# Patient Record
Sex: FEMALE | Race: WHITE | NOT HISPANIC OR LATINO | Employment: PART TIME | URBAN - METROPOLITAN AREA
[De-identification: names, ages, dates, MRNs, and addresses within clinical notes are randomized per-mention and may not be internally consistent; named-entity substitution may affect disease eponyms.]

---

## 2017-02-02 ENCOUNTER — ALLSCRIPTS OFFICE VISIT (OUTPATIENT)
Dept: OTHER | Facility: OTHER | Age: 27
End: 2017-02-02

## 2017-04-18 ENCOUNTER — GENERIC CONVERSION - ENCOUNTER (OUTPATIENT)
Dept: OTHER | Facility: OTHER | Age: 27
End: 2017-04-18

## 2017-06-29 ENCOUNTER — ALLSCRIPTS OFFICE VISIT (OUTPATIENT)
Dept: OTHER | Facility: OTHER | Age: 27
End: 2017-06-29

## 2017-08-12 ENCOUNTER — ALLSCRIPTS OFFICE VISIT (OUTPATIENT)
Dept: OTHER | Facility: OTHER | Age: 27
End: 2017-08-12

## 2017-10-20 ENCOUNTER — ALLSCRIPTS OFFICE VISIT (OUTPATIENT)
Dept: OTHER | Facility: OTHER | Age: 27
End: 2017-10-20

## 2017-10-21 NOTE — PROGRESS NOTES
Assessment  1  Multiple contusions (924 8) (T07  Martina Araujo)    Plan  Need for influenza vaccination    · Stop: Influenza    Discussion/Summary    Advil as needed  No other treatment necessary since both joints are improving  Chief Complaint  1  Knee Injury   2  Knee Pain  Patient is in the office today complaining of having fallen at work last Tuesday she fell on her right knee and right elbow and she is complaining of the pain now she stated that it was sore but now the pain is worse, she also has soreness in her lower back and right hip  History of Present Illness  HPI: Patient comes in after slipping on a wet floor one week ago falling onto her right elbow and right knee  Review of Systems    Constitutional: No fever, no chills, feels well, no tiredness, no recent weight gain or loss  Respiratory: no complaints of shortness of breath, no wheezing, no dyspnea on exertion, no orthopnea or PND  Gastrointestinal: no complaints of abdominal pain, no constipation, no nausea or diarrhea, no vomiting, no bloody stools  Active Problems  1  Acute rhinosinusitis (461 9) (J01 90)   2  Encounter for PPD test (V74 1) (Z11 1)   3  Hypothyroidism (244 9) (E03 9)   4  Need for influenza vaccination (V04 81) (Z23)   5  Need for Tdap vaccination (V06 1) (Z23)   6  Rash (782 1) (R21)   7  Tachycardia (785 0) (R00 0)    Past Medical History  1  History of Abdominal pain, LLQ (789 04) (R10 32)   2  History of Acute bacterial conjunctivitis, unspecified laterality (372 03) (H10 30)   3  History of Acute frontal sinusitis, recurrence not specified (461 1) (J01 10)   4  History of Acute URI (465 9) (J06 9)   5  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)   6  History of Cellulitis of both feet (682 7) (L03 115,L03 116)   7  History of acute sinusitis (V12 69) (Z87 09)   8  History of candidiasis of mouth (V12 09) (Z86 19)   9  History of gastroenteritis (V12 79) (Z87 19)   10   History of nausea and vomiting (V12 79) (Z87 898)   11  History of stress test (V15 89) (Z92 89)   12  History of Otitis media (382 9) (H66 90)   13  History of Palpitations (785 1) (R00 2)   14  History of Sinusitis (473 9) (J32 9)   15  History of Tenosynovitis of hand or wrist (727 05) (M65 88)  Active Problems And Past Medical History Reviewed: The active problems and past medical history were reviewed and updated today  Family History  Mother    1  Denied: Family history of mental disorder   2  Denied: Family history of substance abuse   3  Family history of Hypothyroidism   4  Family history of Psoriasis  Father    5  Family history of Diabetes   6  Family history of Hypertension   7  Family history of Hypothyroidism   8  Family history of Tachycardia    Social History   · Denied: History of Alcohol use   · Always uses seat belt   · Denied: History of Drug use   · Employed   · Never a smoker   · Never smoked   · Occasional caffeine consumption   · Single    Surgical History  1  Denied: History Of Prior Surgery    Current Meds   1  Cefuroxime Axetil 500 MG Oral Tablet; 1 bid with food; Therapy: 06Uwg8025 to (Last Rx:91Nil1982)  Requested for: 86Vhr1967 Ordered   2  Levothyroxine Sodium 175 MCG Oral Tablet; TAKE 1 TABLET DAILY; Therapy: 42KZL8100 to (Rajesh Garzon)  Requested for: 14MUR7109; Last   EX:52PCK5255 Ordered    The medication list was reviewed and updated today  Allergies  1  Penicillins    Vitals   Recorded: 34KNK7658 03:29PM   Temperature 97 4 F   Heart Rate 80   Systolic 245   Diastolic 70   Height 5 ft 2 in   Weight 153 lb    BMI Calculated 27 98   BSA Calculated 1 71   O2 Saturation 96     Physical Exam    Musculoskeletal   Gait and station: Normal     Digits and nails: Normal without clubbing or cyanosis  Inspection/palpation of joints, bones, and muscles: Abnormal  -- Ecchymosis of right elbow and right knee  No joint effusion  Good range of motion in both joints          Signatures   Electronically signed by : STEVIE Méndez ; Oct 20 2017  4:05PM EST                       (Author)

## 2017-12-14 ENCOUNTER — ALLSCRIPTS OFFICE VISIT (OUTPATIENT)
Dept: OTHER | Facility: OTHER | Age: 27
End: 2017-12-14

## 2017-12-15 NOTE — PROGRESS NOTES
Assessment  1  Acute rhinosinusitis (461 9) (J01 90)    Plan  PMH: Acute URI    · Cefuroxime Axetil 500 MG Oral Tablet; 1 bid with food    Chief Complaint  Pt in office today c/o sore throat, cough, ear pain, dizziness, and congestion  Symptoms started 2 days ago  History of Present Illness  HPI: Patient comes in with sinus congestion, sore throat and cough  Review of Systems   Constitutional: fever-- and-- feeling poorly  ENT: nasal discharge  Respiratory: cough  Gastrointestinal: no complaints of abdominal pain, no constipation, no nausea or diarrhea, no vomiting, no bloody stools  Active Problems  1  Acute rhinosinusitis (461 9) (J01 90)   2  Encounter for PPD test (V74 1) (Z11 1)   3  Hypothyroidism (244 9) (E03 9)   4  Multiple contusions (924 8) (T07  XXXA)   5  Need for influenza vaccination (V04 81) (Z23)   6  Need for Tdap vaccination (V06 1) (Z23)   7  Rash (782 1) (R21)   8  Tachycardia (785 0) (R00 0)    Past Medical History  1  History of Abdominal pain, LLQ (789 04) (R10 32)   2  History of Acute bacterial conjunctivitis, unspecified laterality (372 03) (H10 30)   3  History of Acute frontal sinusitis, recurrence not specified (461 1) (J01 10)   4  History of Acute URI (465 9) (J06 9)   5  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)   6  History of Cellulitis of both feet (682 7) (L03 115,L03 116)   7  History of acute sinusitis (V12 69) (Z87 09)   8  History of candidiasis of mouth (V12 09) (Z86 19)   9  History of gastroenteritis (V12 79) (Z87 19)   10  History of nausea and vomiting (V12 79) (Z87 898)   11  History of stress test (V15 89) (Z92 89)   12  History of Otitis media (382 9) (H66 90)   13  History of Palpitations (785 1) (R00 2)   14  History of Sinusitis (473 9) (J32 9)   15  History of Tenosynovitis of hand or wrist (727 05) (M65 88)  Active Problems And Past Medical History Reviewed: The active problems and past medical history were reviewed and updated today        Family History  Mother    1  Denied: Family history of mental disorder   2  Denied: Family history of substance abuse   3  Family history of Hypothyroidism   4  Family history of Psoriasis  Father    5  Family history of Diabetes   6  Family history of Hypertension   7  Family history of Hypothyroidism   8  Family history of Tachycardia    Social History   · Denied: History of Alcohol use   · Always uses seat belt   · Denied: History of Drug use   · Employed   · Never a smoker   · Never smoked   · Occasional caffeine consumption   · Single    Surgical History  1  Denied: History Of Prior Surgery    Current Meds   1  Levothyroxine Sodium 175 MCG Oral Tablet; TAKE 1 TABLET DAILY; Therapy: 43XWV6039 to (Ross Graves)  Requested for: 85DOR9853; Last WS:[id number] Ordered    The medication list was reviewed and updated today  Allergies  1  Penicillins    Vitals   Recorded: 24ISF1839 10:12AM   Temperature 99 8 F   Heart Rate 341   Systolic 400   Diastolic 76   Height 5 ft 2 in   Weight 152 lb    BMI Calculated 27 8   BSA Calculated 1 7   O2 Saturation 97     Physical Exam   Constitutional  General appearance: No acute distress, well appearing and well nourished  Eyes  Conjunctiva and lids: No swelling, erythema or discharge  Pupils and irises: Equal, round and reactive to light  Ears, Nose, Mouth, and Throat  External inspection of ears and nose: Normal    Otoscopic examination: Tympanic membranes translucent with normal light reflex  Canals patent without erythema  Nasal mucosa, septum, and turbinates: Abnormal   The bilateral nasal mucosa was red  Oropharynx: Abnormal   The posterior pharynx was erythematous  Pulmonary  Respiratory effort: No increased work of breathing or signs of respiratory distress  Auscultation of lungs: Clear to auscultation  Cardiovascular  Auscultation of heart: Normal rate and rhythm, normal S1 and S2, without murmurs     Examination of extremities for edema and/or varicosities: Normal    Abdomen  Abdomen: Non-tender, no masses  Lymphatic  Palpation of lymph nodes in neck: No lymphadenopathy  Musculoskeletal  Gait and station: Normal    Digits and nails: Normal without clubbing or cyanosis     Inspection/palpation of joints, bones, and muscles: Normal    Psychiatric  Orientation to person, place, and time: Normal    Mood and affect: Normal          Signatures   Electronically signed by : STEVIE Jaimes ; Dec 14 2017 10:31AM EST                       (Author)

## 2018-01-10 NOTE — PROGRESS NOTES
Assessment    1  Acute frontal sinusitis, recurrence not specified (461 1) (J01 10)    Plan  Acute frontal sinusitis, recurrence not specified    · Ciprofloxacin HCl - 500 MG Oral Tablet; TAKE 1 TABLET EVERY 12 HOURS DAILY  Hypothyroidism    · Levothyroxine Sodium 175 MCG Oral Tablet; TAKE 1 TABLET DAILY    History of Present Illness  Sinusitis (Brief): The patient is being seen for an initial evaluation of sinusitis  The sinusitis involves the frontal sinuses  The sinusitis is classified as acute  The patient is currently experiencing symptoms  facial pain facial pressure   Associated symptoms:   No associated symptoms are reported  Review of Systems    Constitutional: No fever, no chills, feels well, no tiredness, no recent weight gain or loss  ENT: as noted in HPI  Cardiovascular: no complaints of slow or fast heart rate, no chest pain, no palpitations, no leg claudication or lower extremity edema  Respiratory: no complaints of shortness of breath, no wheezing, no dyspnea on exertion, no orthopnea or PND  Gastrointestinal: no complaints of abdominal pain, no constipation, no nausea or diarrhea, no vomiting, no bloody stools  Active Problems    1  Abdominal pain, LLQ (789 04) (R10 32)   2  Acute sinus infection (461 9) (J01 90)   3  Candidiasis of mouth (112 0) (B37 0)   4  Encounter for PPD test (V74 1) (Z11 1)   5  Gastroenteritis (558 9) (K52 9)   6  Hypothyroidism (244 9) (E03 9)   7  Nausea and/or vomiting (787 01) (R11 2)   8  Need for influenza vaccination (V04 81) (Z23)   9  Need for Tdap vaccination (V06 1) (Z23)   10  Rash (782 1) (R21)   11  Tachycardia (785 0) (R00 0)    Past Medical History    1  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)   2  History of stress test (V15 89) (Z92 89)   3  History of Otitis media (382 9) (H66 90)   4  History of Palpitations (785 1) (R00 2)   5  History of Sinusitis (473 9) (J32 9)   6   History of Tenosynovitis of hand or wrist (727 05) (M65 88)    Family History    1  Family history of Hypothyroidism   2  Family history of Psoriasis    3  Family history of Diabetes   4  Family history of Hypertension   5  Family history of Hypothyroidism   6  Family history of Tachycardia    Social History    · Denied: History of Alcohol use   · Denied: History of Drug use   · Never a smoker   · Never smoked    Current Meds   1  Levothyroxine Sodium 175 MCG Oral Tablet; TAKE 1 TABLET DAILY; Therapy: 55WFE6201 to (Evaluate:03Sns3591)  Requested for: 04NPA8976; Last   Rx:13Jun2015 Ordered   2  Metoprolol Tartrate 25 MG Oral Tablet; Therapy: (Recorded:24Nov2014) to Recorded   3  Omeprazole 20 MG Oral Capsule Delayed Release; Therapy: 97ZXF5622 to (Evaluate:19Mar2015) Recorded    Allergies    1  Penicillins    Vitals   Recorded: 80GFP1142 10:23AM   Height 5 ft 2 in   Weight 166 lb    BMI Calculated 30 36   BSA Calculated 1 77     Physical Exam    Constitutional   General appearance: No acute distress, well appearing and well nourished  Ears, Nose, Mouth, and Throat   Nasal mucosa, septum, and turbinates: Abnormal   boggy erythematous turbinates  Pulmonary   Auscultation of lungs: Clear to auscultation  Cardiovascular   Auscultation of heart: Normal rate and rhythm, normal S1 and S2, without murmurs  Examination of extremities for edema and/or varicosities: Normal     Abdomen   Abdomen: Non-tender, no masses  Musculoskeletal   Gait and station: Normal     Neurologic   Cranial nerves: Cranial nerves 2-12 intact      Psychiatric   Orientation to person, place, and time: Normal          Signatures   Electronically signed by : Bart Brennan DO; Feb 1 2016 10:30AM EST                       (Author)

## 2018-01-12 VITALS
TEMPERATURE: 97.4 F | DIASTOLIC BLOOD PRESSURE: 70 MMHG | BODY MASS INDEX: 28.16 KG/M2 | OXYGEN SATURATION: 96 % | WEIGHT: 153 LBS | HEART RATE: 80 BPM | HEIGHT: 62 IN | SYSTOLIC BLOOD PRESSURE: 124 MMHG

## 2018-01-13 VITALS
HEART RATE: 72 BPM | TEMPERATURE: 99.2 F | SYSTOLIC BLOOD PRESSURE: 112 MMHG | OXYGEN SATURATION: 99 % | HEIGHT: 62 IN | WEIGHT: 145 LBS | BODY MASS INDEX: 26.68 KG/M2 | DIASTOLIC BLOOD PRESSURE: 76 MMHG

## 2018-01-13 VITALS
OXYGEN SATURATION: 97 % | TEMPERATURE: 99 F | HEART RATE: 81 BPM | SYSTOLIC BLOOD PRESSURE: 114 MMHG | DIASTOLIC BLOOD PRESSURE: 80 MMHG | HEIGHT: 62 IN | WEIGHT: 142 LBS | BODY MASS INDEX: 26.13 KG/M2

## 2018-01-14 VITALS
TEMPERATURE: 98.3 F | HEART RATE: 83 BPM | WEIGHT: 171 LBS | HEIGHT: 62 IN | SYSTOLIC BLOOD PRESSURE: 122 MMHG | OXYGEN SATURATION: 98 % | DIASTOLIC BLOOD PRESSURE: 80 MMHG | BODY MASS INDEX: 31.47 KG/M2

## 2018-01-23 VITALS
HEART RATE: 100 BPM | BODY MASS INDEX: 27.97 KG/M2 | TEMPERATURE: 99.8 F | DIASTOLIC BLOOD PRESSURE: 76 MMHG | OXYGEN SATURATION: 97 % | HEIGHT: 62 IN | WEIGHT: 152 LBS | SYSTOLIC BLOOD PRESSURE: 122 MMHG

## 2018-03-20 ENCOUNTER — OFFICE VISIT (OUTPATIENT)
Dept: FAMILY MEDICINE CLINIC | Facility: CLINIC | Age: 28
End: 2018-03-20

## 2018-03-20 VITALS
OXYGEN SATURATION: 98 % | HEART RATE: 66 BPM | SYSTOLIC BLOOD PRESSURE: 100 MMHG | WEIGHT: 155 LBS | HEIGHT: 62 IN | TEMPERATURE: 97.8 F | DIASTOLIC BLOOD PRESSURE: 68 MMHG | BODY MASS INDEX: 28.52 KG/M2

## 2018-03-20 DIAGNOSIS — L08.9 SUPERFICIAL BACTERIAL SKIN INFECTION: Primary | ICD-10-CM

## 2018-03-20 DIAGNOSIS — B96.89 SUPERFICIAL BACTERIAL SKIN INFECTION: Primary | ICD-10-CM

## 2018-03-20 PROCEDURE — 99213 OFFICE O/P EST LOW 20 MIN: CPT | Performed by: FAMILY MEDICINE

## 2018-03-20 RX ORDER — LEVOTHYROXINE SODIUM 175 UG/1
1 TABLET ORAL DAILY
COMMUNITY
Start: 2015-02-17 | End: 2018-05-15 | Stop reason: SDUPTHER

## 2018-03-20 RX ORDER — SULFAMETHOXAZOLE AND TRIMETHOPRIM 800; 160 MG/1; MG/1
1 TABLET ORAL EVERY 12 HOURS SCHEDULED
Qty: 20 TABLET | Refills: 0 | Status: SHIPPED | OUTPATIENT
Start: 2018-03-20 | End: 2018-03-30

## 2018-03-20 RX ORDER — MUPIROCIN CALCIUM 20 MG/G
CREAM TOPICAL 3 TIMES DAILY
Qty: 15 G | Refills: 0 | Status: SHIPPED | OUTPATIENT
Start: 2018-03-20 | End: 2020-01-22 | Stop reason: ALTCHOICE

## 2018-03-20 NOTE — PROGRESS NOTES
Assessment/Plan:  Discussed with patient care plan , does not appear infective in nature , that being said will treat , would rec not manipulating area , clean and keep piercing in place , explained healing process, may have to remove piercing       Diagnoses and all orders for this visit:    Superficial bacterial skin infection  -     mupirocin (BACTROBAN) 2 % cream; Apply topically 3 (three) times a day  -     sulfamethoxazole-trimethoprim (BACTRIM DS) 800-160 mg per tablet; Take 1 tablet by mouth every 12 (twelve) hours for 10 days    Other orders  -     levothyroxine 175 mcg tablet; Take 1 tablet by mouth daily              Subjective:      Patient ID: Wilhemina Heimlich is a 28 y o  female  Had a nose piercing about 6 wks ago , and feels it keeps getting infected,   Has been cleaning the piercing as instructed ,   Denies discharge to site, denies swelling or redness to area, neg fever , chills           The following portions of the patient's history were reviewed and updated as appropriate:   She has no past medical history on file  ,   does not have a problem list on file  ,   has no past surgical history on file  ,  family history is not on file  ,   reports that she has never smoked  She has never used smokeless tobacco  Her alcohol and drug histories are not on file  ,  is allergic to penicillins         Review of Systems   Constitutional: Negative for appetite change, chills, fever and unexpected weight change  HENT: Negative for congestion, dental problem, ear pain, hearing loss, postnasal drip, rhinorrhea, sneezing, sore throat and tinnitus  Eyes: Negative  Negative for visual disturbance  Respiratory: Negative  Cardiovascular: Negative  Gastrointestinal: Negative for vomiting  Endocrine: Negative  Negative for cold intolerance, heat intolerance, polydipsia, polyphagia and polyuria  Musculoskeletal: Negative for arthralgias, back pain, gait problem, joint swelling and myalgias     Skin: Positive for wound  Negative for color change and rash  Allergic/Immunologic: Negative for environmental allergies and food allergies  Neurological: Negative  Negative for weakness  Hematological: Negative for adenopathy  Does not bruise/bleed easily  Psychiatric/Behavioral: Negative for sleep disturbance and suicidal ideas  The patient is not nervous/anxious  Objective:  Vitals:    03/20/18 1412   BP: 100/68   Pulse: 66   Temp: 97 8 °F (36 6 °C)   SpO2: 98%      Physical Exam   Constitutional: She is oriented to person, place, and time  She appears well-developed and well-nourished  HENT:   Head: Normocephalic and atraumatic  Eyes: Conjunctivae are normal    Neck: Normal range of motion  Pulmonary/Chest: Effort normal    Musculoskeletal: Normal range of motion  Neurological: She is oriented to person, place, and time     Skin:   Nose  Left side with piercing in place   No apparent erythema , redness or swelling   Neg tender  Neg drainage   Pos scar/ keloid  tissue at entry point    Psychiatric: Her behavior is normal  Judgment and thought content normal

## 2018-04-09 ENCOUNTER — OFFICE VISIT (OUTPATIENT)
Dept: FAMILY MEDICINE CLINIC | Facility: CLINIC | Age: 28
End: 2018-04-09

## 2018-04-09 VITALS
TEMPERATURE: 97.1 F | DIASTOLIC BLOOD PRESSURE: 72 MMHG | SYSTOLIC BLOOD PRESSURE: 108 MMHG | HEART RATE: 95 BPM | HEIGHT: 62 IN | BODY MASS INDEX: 28.39 KG/M2 | WEIGHT: 154.3 LBS | OXYGEN SATURATION: 98 %

## 2018-04-09 DIAGNOSIS — H69.80 EUSTACHIAN TUBE DYSFUNCTION, UNSPECIFIED LATERALITY: ICD-10-CM

## 2018-04-09 DIAGNOSIS — J06.9 VIRAL UPPER RESPIRATORY TRACT INFECTION: Primary | ICD-10-CM

## 2018-04-09 PROBLEM — H69.90 EUSTACHIAN TUBE DYSFUNCTION, UNSPECIFIED LATERALITY: Status: ACTIVE | Noted: 2018-04-09

## 2018-04-09 LAB — S PYO AG THROAT QL: NEGATIVE

## 2018-04-09 PROCEDURE — 99212 OFFICE O/P EST SF 10 MIN: CPT | Performed by: NURSE PRACTITIONER

## 2018-04-09 PROCEDURE — 87880 STREP A ASSAY W/OPTIC: CPT | Performed by: NURSE PRACTITIONER

## 2018-04-09 PROCEDURE — 87070 CULTURE OTHR SPECIMN AEROBIC: CPT | Performed by: NURSE PRACTITIONER

## 2018-04-09 RX ORDER — CEFUROXIME AXETIL 500 MG/1
TABLET ORAL
COMMUNITY
Start: 2017-08-12 | End: 2020-01-22 | Stop reason: ALTCHOICE

## 2018-04-09 RX ORDER — CIPROFLOXACIN 500 MG/1
TABLET, FILM COATED ORAL 2 TIMES DAILY
COMMUNITY
Start: 2018-01-02 | End: 2020-01-22 | Stop reason: ALTCHOICE

## 2018-04-09 RX ORDER — MOMETASONE FUROATE 50 UG/1
2 SPRAY, METERED NASAL DAILY
Qty: 17 G | Refills: 0 | Status: SHIPPED | OUTPATIENT
Start: 2018-04-09 | End: 2021-09-20 | Stop reason: ALTCHOICE

## 2018-04-09 NOTE — PROGRESS NOTES
Assessment/Plan:    Upper respiratory infection  Rapid strep negative, will send out for culture and follow  Counseled on increasing fluid intake, blowing nose frequently, and using a cool mist humidifier nighttime  Follow-up is symptoms do not begin to improve by early next week  Eustachian tube dysfunction, unspecified laterality    To begin Nasonex every 12 hours  Diagnoses and all orders for this visit:    Viral upper respiratory tract infection  -     POCT rapid strepA  -     Throat culture    Eustachian tube dysfunction, unspecified laterality  -     mometasone (NASONEX) 50 mcg/act nasal spray; 2 sprays into each nostril daily            Subjective:      Patient ID: Ervin Hopper is a 28 y o  female  URI    This is a new problem  The current episode started in the past 7 days  The problem has been unchanged  There has been no fever  Associated symptoms include congestion, coughing, a plugged ear sensation, rhinorrhea and a sore throat  She has tried acetaminophen (OTC Cold medications ) for the symptoms  The treatment provided mild relief  The following portions of the patient's history were reviewed and updated as appropriate:   She  has no past medical history on file  She   Patient Active Problem List    Diagnosis Date Noted    Upper respiratory infection 04/09/2018    Eustachian tube dysfunction, unspecified laterality 04/09/2018    Hypothyroidism 11/24/2014     She  has no past surgical history on file  Her family history is not on file  She  reports that she has never smoked  She has never used smokeless tobacco  Her alcohol and drug histories are not on file    Current Outpatient Prescriptions   Medication Sig Dispense Refill    levothyroxine 175 mcg tablet Take 1 tablet by mouth daily      cefuroxime (CEFTIN) 500 mg tablet Take by mouth      ciprofloxacin (CIPRO) 500 mg tablet Take by mouth 2 (two) times a day      mometasone (NASONEX) 50 mcg/act nasal spray 2 sprays into each nostril daily 17 g 0    mupirocin (BACTROBAN) 2 % cream Apply topically 3 (three) times a day 15 g 0     No current facility-administered medications for this visit  Current Outpatient Prescriptions on File Prior to Visit   Medication Sig    levothyroxine 175 mcg tablet Take 1 tablet by mouth daily    mupirocin (BACTROBAN) 2 % cream Apply topically 3 (three) times a day     No current facility-administered medications on file prior to visit  She is allergic to penicillins       Review of Systems   Constitutional: Positive for fatigue  Negative for fever  HENT: Positive for congestion, postnasal drip, rhinorrhea, sinus pressure and sore throat  Eyes: Negative  Respiratory: Positive for cough and chest tightness  Cardiovascular: Negative  Genitourinary: Negative  Musculoskeletal: Negative  Neurological: Positive for dizziness  Psychiatric/Behavioral: Negative  /72   Pulse 95   Temp (!) 97 1 °F (36 2 °C)   Ht 5' 2" (1 575 m)   Wt 70 kg (154 lb 4 8 oz)   SpO2 98%   BMI 28 22 kg/m²     Objective:     Physical Exam   Constitutional: She is oriented to person, place, and time  She appears well-developed and well-nourished  HENT:   Head: Normocephalic and atraumatic  Right Ear: Hearing normal  Tympanic membrane is not erythematous  Left Ear: Hearing normal  Tympanic membrane is not erythematous  A middle ear effusion is present  Nose: Mucosal edema and rhinorrhea present  Right sinus exhibits no maxillary sinus tenderness and no frontal sinus tenderness  Left sinus exhibits no maxillary sinus tenderness and no frontal sinus tenderness  Mouth/Throat: Posterior oropharyngeal erythema present  No oropharyngeal exudate  Neck: Neck supple  Cardiovascular: Normal rate, regular rhythm and normal heart sounds  No murmur heard  Pulmonary/Chest: Effort normal and breath sounds normal  No respiratory distress  She has no wheezes  She has no rales   She exhibits no tenderness  Lymphadenopathy:     She has no cervical adenopathy  Neurological: She is alert and oriented to person, place, and time  Psychiatric: She has a normal mood and affect   Her behavior is normal  Judgment and thought content normal        Results for orders placed or performed in visit on 04/09/18   POCT rapid strepA   Result Value Ref Range     RAPID STREP A Negative Negative

## 2018-04-09 NOTE — ASSESSMENT & PLAN NOTE
Rapid strep negative, will send out for culture and follow  Counseled on increasing fluid intake, blowing nose frequently, and using a cool mist humidifier nighttime  Follow-up is symptoms do not begin to improve by early next week

## 2018-04-11 LAB — BACTERIA THROAT CULT: NORMAL

## 2018-05-15 DIAGNOSIS — E03.9 HYPOTHYROIDISM IN ADULT: Primary | ICD-10-CM

## 2018-05-15 RX ORDER — LEVOTHYROXINE SODIUM 175 UG/1
175 TABLET ORAL DAILY
Qty: 90 TABLET | Refills: 0 | Status: SHIPPED | OUTPATIENT
Start: 2018-05-15 | End: 2021-05-26 | Stop reason: ALTCHOICE

## 2020-01-22 ENCOUNTER — OFFICE VISIT (OUTPATIENT)
Dept: FAMILY MEDICINE CLINIC | Facility: CLINIC | Age: 30
End: 2020-01-22

## 2020-01-22 VITALS
DIASTOLIC BLOOD PRESSURE: 80 MMHG | WEIGHT: 176 LBS | TEMPERATURE: 98.7 F | HEIGHT: 62 IN | SYSTOLIC BLOOD PRESSURE: 122 MMHG | HEART RATE: 64 BPM | OXYGEN SATURATION: 94 % | BODY MASS INDEX: 32.39 KG/M2

## 2020-01-22 DIAGNOSIS — B02.9 HERPES ZOSTER WITHOUT COMPLICATION: Primary | ICD-10-CM

## 2020-01-22 PROCEDURE — 99214 OFFICE O/P EST MOD 30 MIN: CPT | Performed by: FAMILY MEDICINE

## 2020-01-22 RX ORDER — VALACYCLOVIR HYDROCHLORIDE 1 G/1
TABLET, FILM COATED ORAL
Qty: 21 TABLET | Refills: 0 | Status: SHIPPED | OUTPATIENT
Start: 2020-01-22 | End: 2021-05-06 | Stop reason: ALTCHOICE

## 2020-01-22 RX ORDER — AMITRIPTYLINE HYDROCHLORIDE 10 MG/1
TABLET, FILM COATED ORAL
Qty: 60 TABLET | Refills: 1 | Status: SHIPPED | OUTPATIENT
Start: 2020-01-22 | End: 2021-05-06 | Stop reason: ALTCHOICE

## 2020-01-22 NOTE — PROGRESS NOTES
Standard Visit   Assessment/Plan:     Visit Diagnosis:  Diagnoses and all orders for this visit:    Herpes zoster without complication  -     valACYclovir (VALTREX) 1,000 mg tablet; Take 1 pill 3 times a day x7 days  -     amitriptyline (ELAVIL) 10 mg tablet; Take 1 pill every evening x2 months       write a note   She can return to work on Monday  Keep the area dry  Complete the entire course of Valtrex   take the amitriptyline 10 mg every night at bedtime x2 months   Call for failure to him    Subjective:    Patient ID: Tony Vasquez is a 26 y o  female  Patient is here with the following concerns:     Complains of a very irritated rash underneath her left breast  Skin was very hypersensitive  Then she felt this blistering rash  No fever   No streaking  She has had chickenpox in the past         The following portions of the patient's history were reviewed and updated as appropriate: allergies, current medications, past family history, past medical history, past social history, past surgical history and problem list     Review of Systems   Constitutional: Positive for activity change and fatigue  Negative for fever  Skin: Positive for rash           /80   Pulse 64   Temp 98 7 °F (37 1 °C)   Ht 5' 2" (1 575 m)   Wt 79 8 kg (176 lb)   SpO2 94%   BMI 32 19 kg/m²   Social History     Socioeconomic History    Marital status: Single     Spouse name: Not on file    Number of children: Not on file    Years of education: Not on file    Highest education level: Not on file   Occupational History    Not on file   Social Needs    Financial resource strain: Not on file    Food insecurity:     Worry: Not on file     Inability: Not on file    Transportation needs:     Medical: Not on file     Non-medical: Not on file   Tobacco Use    Smoking status: Never Smoker    Smokeless tobacco: Never Used   Substance and Sexual Activity    Alcohol use: Not on file    Drug use: Not on file    Sexual activity: Not on file   Lifestyle    Physical activity:     Days per week: Not on file     Minutes per session: Not on file    Stress: Not on file   Relationships    Social connections:     Talks on phone: Not on file     Gets together: Not on file     Attends Alevism service: Not on file     Active member of club or organization: Not on file     Attends meetings of clubs or organizations: Not on file     Relationship status: Not on file    Intimate partner violence:     Fear of current or ex partner: Not on file     Emotionally abused: Not on file     Physically abused: Not on file     Forced sexual activity: Not on file   Other Topics Concern    Not on file   Social History Narrative    Not on file     History reviewed  No pertinent past medical history  History reviewed  No pertinent family history  History reviewed  No pertinent surgical history      Current Outpatient Medications:     amitriptyline (ELAVIL) 10 mg tablet, Take 1 pill every evening x2 months, Disp: 60 tablet, Rfl: 1    levothyroxine 175 mcg tablet, Take 1 tablet (175 mcg total) by mouth daily, Disp: 90 tablet, Rfl: 0    mometasone (NASONEX) 50 mcg/act nasal spray, 2 sprays into each nostril daily, Disp: 17 g, Rfl: 0    valACYclovir (VALTREX) 1,000 mg tablet, Take 1 pill 3 times a day x7 days, Disp: 21 tablet, Rfl: 0  Social History     Socioeconomic History    Marital status: Single     Spouse name: Not on file    Number of children: Not on file    Years of education: Not on file    Highest education level: Not on file   Occupational History    Not on file   Social Needs    Financial resource strain: Not on file    Food insecurity:     Worry: Not on file     Inability: Not on file    Transportation needs:     Medical: Not on file     Non-medical: Not on file   Tobacco Use    Smoking status: Never Smoker    Smokeless tobacco: Never Used   Substance and Sexual Activity    Alcohol use: Not on file    Drug use: Not on file  Sexual activity: Not on file   Lifestyle    Physical activity:     Days per week: Not on file     Minutes per session: Not on file    Stress: Not on file   Relationships    Social connections:     Talks on phone: Not on file     Gets together: Not on file     Attends Taoist service: Not on file     Active member of club or organization: Not on file     Attends meetings of clubs or organizations: Not on file     Relationship status: Not on file    Intimate partner violence:     Fear of current or ex partner: Not on file     Emotionally abused: Not on file     Physically abused: Not on file     Forced sexual activity: Not on file   Other Topics Concern    Not on file   Social History Narrative    Not on file     History reviewed  No pertinent past medical history  Current Outpatient Medications:     amitriptyline (ELAVIL) 10 mg tablet, Take 1 pill every evening x2 months, Disp: 60 tablet, Rfl: 1    levothyroxine 175 mcg tablet, Take 1 tablet (175 mcg total) by mouth daily, Disp: 90 tablet, Rfl: 0    mometasone (NASONEX) 50 mcg/act nasal spray, 2 sprays into each nostril daily, Disp: 17 g, Rfl: 0    valACYclovir (VALTREX) 1,000 mg tablet, Take 1 pill 3 times a day x7 days, Disp: 21 tablet, Rfl: 0  History reviewed  No pertinent family history  Lab Review   No visits with results within 6 Month(s) from this visit  Latest known visit with results is:   Office Visit on 04/09/2018   Component Date Value     RAPID STREP A 04/09/2018 Negative     Throat Culture 04/09/2018 Negative for beta-hemolytic Streptococcus         Objective:     Physical Exam   Constitutional: She is oriented to person, place, and time  She appears well-developed and well-nourished  Neck: Normal range of motion  Neck supple  Cardiovascular: Normal rate  Pulmonary/Chest: Effort normal and breath sounds normal    Neurological: She is alert and oriented to person, place, and time     Skin:   Blistering vesicular rash underneath the breast  Does not cross the midline  No streaking   No signs of secondary infection      Psychiatric: She has a normal mood and affect  Nursing note and vitals reviewed  There are no Patient Instructions on file for this visit          Zeb Klinefelter, CRNP

## 2020-01-29 ENCOUNTER — TELEPHONE (OUTPATIENT)
Dept: FAMILY MEDICINE CLINIC | Facility: CLINIC | Age: 30
End: 2020-01-29

## 2020-01-29 NOTE — TELEPHONE ENCOUNTER
She said she finished her antiviral medication for her shingles  She wanted to know how long will she still have the pain and the sores?

## 2020-02-07 ENCOUNTER — TELEPHONE (OUTPATIENT)
Dept: FAMILY MEDICINE CLINIC | Facility: CLINIC | Age: 30
End: 2020-02-07

## 2021-05-06 ENCOUNTER — TELEPHONE (OUTPATIENT)
Dept: FAMILY MEDICINE CLINIC | Facility: CLINIC | Age: 31
End: 2021-05-06

## 2021-05-06 ENCOUNTER — APPOINTMENT (OUTPATIENT)
Dept: LAB | Facility: HOSPITAL | Age: 31
End: 2021-05-06
Payer: COMMERCIAL

## 2021-05-06 ENCOUNTER — OFFICE VISIT (OUTPATIENT)
Dept: FAMILY MEDICINE CLINIC | Facility: CLINIC | Age: 31
End: 2021-05-06
Payer: COMMERCIAL

## 2021-05-06 VITALS
HEART RATE: 75 BPM | OXYGEN SATURATION: 98 % | WEIGHT: 180 LBS | HEIGHT: 63 IN | TEMPERATURE: 96.7 F | SYSTOLIC BLOOD PRESSURE: 122 MMHG | DIASTOLIC BLOOD PRESSURE: 74 MMHG | BODY MASS INDEX: 31.89 KG/M2

## 2021-05-06 DIAGNOSIS — Z13.6 SCREENING FOR CARDIOVASCULAR CONDITION: ICD-10-CM

## 2021-05-06 DIAGNOSIS — N92.6 ABNORMAL MENSES: ICD-10-CM

## 2021-05-06 DIAGNOSIS — Z13.1 SCREENING FOR DIABETES MELLITUS: ICD-10-CM

## 2021-05-06 DIAGNOSIS — E03.9 ACQUIRED HYPOTHYROIDISM: ICD-10-CM

## 2021-05-06 DIAGNOSIS — N64.3 GALACTORRHEA OF BOTH BREASTS: ICD-10-CM

## 2021-05-06 DIAGNOSIS — R11.2 NON-INTRACTABLE VOMITING WITH NAUSEA, UNSPECIFIED VOMITING TYPE: ICD-10-CM

## 2021-05-06 DIAGNOSIS — R42 VERTIGO: ICD-10-CM

## 2021-05-06 DIAGNOSIS — Z00.00 ANNUAL PHYSICAL EXAM: Primary | ICD-10-CM

## 2021-05-06 LAB
ALBUMIN SERPL BCP-MCNC: 4.4 G/DL (ref 3.5–5)
ALP SERPL-CCNC: 41 U/L (ref 46–116)
ALT SERPL W P-5'-P-CCNC: 25 U/L (ref 12–78)
ANION GAP SERPL CALCULATED.3IONS-SCNC: 10 MMOL/L (ref 4–13)
AST SERPL W P-5'-P-CCNC: 52 U/L (ref 5–45)
BILIRUB SERPL-MCNC: 0.93 MG/DL (ref 0.2–1)
BUN SERPL-MCNC: 14 MG/DL (ref 5–25)
CALCIUM SERPL-MCNC: 9.3 MG/DL (ref 8.3–10.1)
CHLORIDE SERPL-SCNC: 100 MMOL/L (ref 100–108)
CHOLEST SERPL-MCNC: 362 MG/DL (ref 50–200)
CO2 SERPL-SCNC: 27 MMOL/L (ref 21–32)
CREAT SERPL-MCNC: 1.34 MG/DL (ref 0.6–1.3)
ERYTHROCYTE [DISTWIDTH] IN BLOOD BY AUTOMATED COUNT: 12.9 % (ref 11.6–15.1)
GFR SERPL CREATININE-BSD FRML MDRD: 53 ML/MIN/1.73SQ M
GLUCOSE P FAST SERPL-MCNC: 83 MG/DL (ref 65–99)
HCT VFR BLD AUTO: 32.7 % (ref 34.8–46.1)
HDLC SERPL-MCNC: 79 MG/DL
HGB BLD-MCNC: 10.9 G/DL (ref 11.5–15.4)
LDLC SERPL CALC-MCNC: 255 MG/DL (ref 0–100)
MCH RBC QN AUTO: 33.6 PG (ref 26.8–34.3)
MCHC RBC AUTO-ENTMCNC: 33.3 G/DL (ref 31.4–37.4)
MCV RBC AUTO: 101 FL (ref 82–98)
NONHDLC SERPL-MCNC: 283 MG/DL
PLATELET # BLD AUTO: 323 THOUSANDS/UL (ref 149–390)
PMV BLD AUTO: 9.6 FL (ref 8.9–12.7)
POTASSIUM SERPL-SCNC: 4.4 MMOL/L (ref 3.5–5.3)
PROLACTIN SERPL-MCNC: 21.3 NG/ML
PROT SERPL-MCNC: 8.3 G/DL (ref 6.4–8.2)
RBC # BLD AUTO: 3.24 MILLION/UL (ref 3.81–5.12)
SL AMB POCT URINE HCG: NEGATIVE
SODIUM SERPL-SCNC: 137 MMOL/L (ref 136–145)
T4 FREE SERPL-MCNC: 0.16 NG/DL (ref 0.76–1.46)
TRIGL SERPL-MCNC: 139 MG/DL
TSH SERPL DL<=0.05 MIU/L-ACNC: >500 UIU/ML (ref 0.36–3.74)
WBC # BLD AUTO: 7.66 THOUSAND/UL (ref 4.31–10.16)

## 2021-05-06 PROCEDURE — 84443 ASSAY THYROID STIM HORMONE: CPT

## 2021-05-06 PROCEDURE — 85027 COMPLETE CBC AUTOMATED: CPT

## 2021-05-06 PROCEDURE — 80061 LIPID PANEL: CPT

## 2021-05-06 PROCEDURE — 84439 ASSAY OF FREE THYROXINE: CPT

## 2021-05-06 PROCEDURE — 84146 ASSAY OF PROLACTIN: CPT

## 2021-05-06 PROCEDURE — 3725F SCREEN DEPRESSION PERFORMED: CPT | Performed by: PHYSICIAN ASSISTANT

## 2021-05-06 PROCEDURE — 36415 COLL VENOUS BLD VENIPUNCTURE: CPT

## 2021-05-06 PROCEDURE — 80053 COMPREHEN METABOLIC PANEL: CPT

## 2021-05-06 PROCEDURE — 99395 PREV VISIT EST AGE 18-39: CPT | Performed by: PHYSICIAN ASSISTANT

## 2021-05-06 PROCEDURE — 1036F TOBACCO NON-USER: CPT | Performed by: PHYSICIAN ASSISTANT

## 2021-05-06 PROCEDURE — 3008F BODY MASS INDEX DOCD: CPT | Performed by: PHYSICIAN ASSISTANT

## 2021-05-06 PROCEDURE — 81025 URINE PREGNANCY TEST: CPT | Performed by: PHYSICIAN ASSISTANT

## 2021-05-06 RX ORDER — ONDANSETRON 8 MG/1
8 TABLET, ORALLY DISINTEGRATING ORAL EVERY 8 HOURS PRN
Qty: 20 TABLET | Refills: 0 | Status: SHIPPED | OUTPATIENT
Start: 2021-05-06 | End: 2021-09-20 | Stop reason: ALTCHOICE

## 2021-05-06 RX ORDER — MECLIZINE HYDROCHLORIDE 25 MG/1
25 TABLET ORAL EVERY 8 HOURS PRN
Qty: 30 TABLET | Refills: 0 | Status: SHIPPED | OUTPATIENT
Start: 2021-05-06 | End: 2021-09-20 | Stop reason: ALTCHOICE

## 2021-05-06 NOTE — PROGRESS NOTES
[address]    NAME: Corinna Alfaro hunter  AGE: 31 y o  SEX: female  : 1990     DATE: 2021     Assessment and Plan:     Problem List Items Addressed This Visit        Endocrine    Hypothyroidism    Relevant Orders    Lipid panel    TSH, 3rd generation    T4, free       Other    Annual physical exam - Primary    Abnormal menses    Relevant Orders    POCT urine HCG (Completed)    CBC and Platelet    TSH, 3rd generation    Galactorrhea of both breasts    Relevant Orders    Prolactin      Other Visit Diagnoses     Screening for diabetes mellitus        Relevant Orders    Comprehensive metabolic panel    Screening for cardiovascular condition        Relevant Orders    Lipid panel    Vertigo        Relevant Medications    ondansetron (ZOFRAN-ODT) 8 mg disintegrating tablet    meclizine (ANTIVERT) 25 mg tablet    Non-intractable vomiting with nausea, unspecified vomiting type        Relevant Medications    ondansetron (ZOFRAN-ODT) 8 mg disintegrating tablet    meclizine (ANTIVERT) 25 mg tablet          Immunizations and preventive care screenings were discussed with patient today  Appropriate education was printed on patient's after visit summary  Counseling:  Dental Health: discussed importance of regular tooth brushing, flossing, and dental visits  Injury prevention: discussed safety/seat belts, safety helmets, smoke detectors, carbon dioxide detectors, and smoking near bedding or upholstery  · Exercise: the importance of regular exercise/physical activity was discussed  Recommend exercise 3-5 times per week for at least 30 minutes  BMI Counseling: Body mass index is 31 89 kg/m²   The BMI is above normal  Nutrition recommendations include encouraging healthy choices of fruits and vegetables, consuming healthier snacks, limiting drinks that contain sugar, moderation in carbohydrate intake, increasing intake of lean protein, reducing intake of saturated and trans fat and reducing intake of cholesterol  Exercise recommendations include moderate physical activity 150 minutes/week  No pharmacotherapy was ordered  No follow-ups on file  Chief Complaint:     Chief Complaint   Patient presents with    Annual Exam     Pt presents today for an annual physical exam  Pt hasnt had her thyroid levels checked in a long time  Pt has never had a pap screening   Nausea     Pt states that this past Tuesday she woke up feeling really nauseous after eating breakfast and vomitted  Pt states that she has been having morning sickness and doesnt think she is pregnant  HCG Urine pregnancy ordered   Fatigue     Pt states that she has also been fatigued lately   Dizziness     Pt also c/o dizziness since Tuesday as well   Allergies     Pt states that her allergies have been causing her post nasal drip  History of Present Illness:     Adult Annual Physical   Patient here for a comprehensive physical exam  The patient reports has not been taking thyroid medication, feels dizzy, nauseaous and having leakage of white discharge from both breasts  Diet and Physical Activity  · Diet/Nutrition: well balanced diet  · Exercise: walking  Depression Screening  PHQ-9 Depression Screening    PHQ-9:   Frequency of the following problems over the past two weeks:      Little interest or pleasure in doing things: 0 - not at all  Feeling down, depressed, or hopeless: 0 - not at all  PHQ-2 Score: 0       General Health  · Sleep: sleeps well  · Hearing: normal - bilateral   · Vision: no vision problems and most recent eye exam >1 year ago  · Dental: regular  /GYN Health  · Last menstrual period: 11/29/2020, has always been irregular and skipping for months  · Contraceptive method: none    · History of STDs?: no      Review of Systems:     Review of Systems   Constitutional: Negative for chills and fever  HENT: Negative for congestion, ear pain, postnasal drip, sneezing, sore throat, tinnitus and trouble swallowing  Eyes: Negative for pain and visual disturbance  Respiratory: Negative for cough and chest tightness  Gastrointestinal: Positive for nausea and vomiting  Negative for abdominal pain, constipation and diarrhea  Endocrine: Negative for heat intolerance, polydipsia, polyphagia and polyuria  Genitourinary: Positive for menstrual problem  Negative for difficulty urinating, dysuria and flank pain  Musculoskeletal: Negative for back pain and myalgias  Skin: Negative for color change and rash  Allergic/Immunologic: Negative for environmental allergies, food allergies and immunocompromised state  Neurological: Positive for dizziness  Negative for seizures, syncope, light-headedness and headaches  Psychiatric/Behavioral: Negative for decreased concentration, self-injury and sleep disturbance  The patient is not nervous/anxious  Past Medical History:     History reviewed  No pertinent past medical history  Past Surgical History:     History reviewed  No pertinent surgical history     Social History:        Social History     Socioeconomic History    Marital status: Single     Spouse name: None    Number of children: None    Years of education: None    Highest education level: None   Occupational History    None   Social Needs    Financial resource strain: None    Food insecurity     Worry: None     Inability: None    Transportation needs     Medical: None     Non-medical: None   Tobacco Use    Smoking status: Never Smoker    Smokeless tobacco: Never Used   Substance and Sexual Activity    Alcohol use: None    Drug use: None    Sexual activity: None   Lifestyle    Physical activity     Days per week: None     Minutes per session: None    Stress: None   Relationships    Social connections     Talks on phone: None     Gets together: None     Attends Pentecostal service: None     Active member of club or organization: None     Attends meetings of clubs or organizations: None     Relationship status: None    Intimate partner violence     Fear of current or ex partner: None     Emotionally abused: None     Physically abused: None     Forced sexual activity: None   Other Topics Concern    None   Social History Narrative    None      Family History:     History reviewed  No pertinent family history  Current Medications:     Current Outpatient Medications   Medication Sig Dispense Refill    levothyroxine 175 mcg tablet Take 1 tablet (175 mcg total) by mouth daily (Patient not taking: Reported on 5/6/2021) 90 tablet 0    meclizine (ANTIVERT) 25 mg tablet Take 1 tablet (25 mg total) by mouth every 8 (eight) hours as needed for dizziness 30 tablet 0    mometasone (NASONEX) 50 mcg/act nasal spray 2 sprays into each nostril daily (Patient not taking: Reported on 5/6/2021) 17 g 0    ondansetron (ZOFRAN-ODT) 8 mg disintegrating tablet Take 1 tablet (8 mg total) by mouth every 8 (eight) hours as needed for nausea or vomiting 20 tablet 0     No current facility-administered medications for this visit  Allergies: Allergies   Allergen Reactions    Penicillins Hives      Physical Exam:     /74 (BP Location: Left arm, Patient Position: Sitting, Cuff Size: Adult)   Pulse 75   Temp (!) 96 7 °F (35 9 °C) (Tympanic)   Ht 5' 3" (1 6 m)   Wt 81 6 kg (180 lb)   SpO2 98%   BMI 31 89 kg/m²     Physical Exam  Vitals signs and nursing note reviewed  Constitutional:       Appearance: She is ill-appearing  HENT:      Head: Normocephalic and atraumatic  Right Ear: Tympanic membrane, ear canal and external ear normal       Left Ear: Tympanic membrane, ear canal and external ear normal       Nose: Nose normal       Mouth/Throat:      Mouth: Mucous membranes are moist       Pharynx: Oropharynx is clear  Eyes:      Extraocular Movements: Extraocular movements intact  Conjunctiva/sclera: Conjunctivae normal    Neck:      Musculoskeletal: Normal range of motion  Vascular: No carotid bruit  Cardiovascular:      Rate and Rhythm: Normal rate and regular rhythm  Pulses: Normal pulses  Heart sounds: Normal heart sounds  Pulmonary:      Effort: Pulmonary effort is normal       Breath sounds: Normal breath sounds  Abdominal:      General: Bowel sounds are normal  There is no distension  Palpations: Abdomen is soft  There is no mass  Tenderness: There is no abdominal tenderness  There is no right CVA tenderness or left CVA tenderness  Musculoskeletal: Normal range of motion  Right lower leg: No edema  Left lower leg: No edema  Lymphadenopathy:      Cervical: No cervical adenopathy  Skin:     General: Skin is warm and dry  Coloration: Skin is pale  Neurological:      General: No focal deficit present  Mental Status: She is alert and oriented to person, place, and time  Psychiatric:         Mood and Affect: Mood normal          Behavior: Behavior normal          Thought Content:  Thought content normal          Judgment: Judgment normal         Results for orders placed or performed in visit on 05/06/21   POCT urine HCG   Result Value Ref Range    URINE HCG negative      [id number]

## 2021-05-06 NOTE — PATIENT INSTRUCTIONS
Wellness Visit for Adults   AMBULATORY CARE:   A wellness visit  is when you see your healthcare provider to get screened for health problems  Your healthcare provider will also give you advice on how to stay healthy  Write down your questions so you remember to ask them  Ask your healthcare provider how often you should have a wellness visit  What happens at a wellness visit:  Your healthcare provider will ask about your health, and your family history of health problems  This includes high blood pressure, heart disease, and cancer  He or she will ask if you have symptoms that concern you, if you smoke, and about your mood  You may also be asked about your intake of medicines, supplements, food, and alcohol  Any of the following may be done:  · Your weight  will be checked  Your height may also be checked so your body mass index (BMI) can be calculated  Your BMI shows if you are at a healthy weight  · Your blood pressure  and heart rate will be checked  Your temperature may also be checked  · Blood and urine tests  may be done  Blood tests may be done to check your cholesterol levels  Abnormal cholesterol levels increase your risk for heart disease and stroke  You may also need a blood or urine test to check for diabetes if you are at increased risk  Urine tests may be done to look for signs of an infection or kidney disease  · A physical exam  includes checking your heartbeat and lungs with a stethoscope  Your healthcare provider may also check your skin to look for sun damage  · Screening tests  may be recommended  A screening test is done to check for diseases that may not cause symptoms  The screening tests you may need depend on your age, gender, family history, and lifestyle habits  For example, colorectal screening may be recommended if you are 48years old or older  Screening tests you need if you are a woman:   · A Pap smear  is used to screen for cervical cancer   Pap smears are usually done every 3 to 5 years depending on your age  You may need them more often if you have had abnormal Pap smear test results in the past  Ask your healthcare provider how often you should have a Pap smear  · A mammogram  is an x-ray of your breasts to screen for breast cancer  Experts recommend mammograms every 2 years starting at age 48 years  You may need a mammogram at age 52 years or younger if you have an increased risk for breast cancer  Talk to your healthcare provider about when you should start having mammograms and how often you need them  Vaccines you may need:   · Get an influenza vaccine  every year  The influenza vaccine protects you from the flu  Several types of viruses cause the flu  The viruses change over time, so new vaccines are made each year  · Get a tetanus-diphtheria (Td) booster vaccine  every 10 years  This vaccine protects you against tetanus and diphtheria  Tetanus is a severe infection that may cause painful muscle spasms and lockjaw  Diphtheria is a severe bacterial infection that causes a thick covering in the back of your mouth and throat  · Get a human papillomavirus (HPV) vaccine  if you are female and aged 23 to 32 or male 23 to 24 and never received it  This vaccine protects you from HPV infection  HPV is the most common infection spread by sexual contact  HPV may also cause vaginal, penile, and anal cancers  · Get a pneumococcal vaccine  if you are aged 72 years or older  The pneumococcal vaccine is an injection given to protect you from pneumococcal disease  Pneumococcal disease is an infection caused by pneumococcal bacteria  The infection may cause pneumonia, meningitis, or an ear infection  · Get a shingles vaccine  if you are 60 or older, even if you have had shingles before  The shingles vaccine is an injection to protect you from the varicella-zoster virus  This is the same virus that causes chickenpox   Shingles is a painful rash that develops in people who had chickenpox or have been exposed to the virus  How to eat healthy:  My Plate is a model for planning healthy meals  It shows the types and amounts of foods that should go on your plate  Fruits and vegetables make up about half of your plate, and grains and protein make up the other half  A serving of dairy is included on the side of your plate  The amount of calories and serving sizes you need depends on your age, gender, weight, and height  Examples of healthy foods are listed below:  · Eat a variety of vegetables  such as dark green, red, and orange vegetables  You can also include canned vegetables low in sodium (salt) and frozen vegetables without added butter or sauces  · Eat a variety of fresh fruits , canned fruit in 100% juice, frozen fruit, and dried fruit  · Include whole grains  At least half of the grains you eat should be whole grains  Examples include whole-wheat bread, wheat pasta, brown rice, and whole-grain cereals such as oatmeal     · Eat a variety of protein foods such as seafood (fish and shellfish), lean meat, and poultry without skin (turkey and chicken)  Examples of lean meats include pork leg, shoulder, or tenderloin, and beef round, sirloin, tenderloin, and extra lean ground beef  Other protein foods include eggs and egg substitutes, beans, peas, soy products, nuts, and seeds  · Choose low-fat dairy products such as skim or 1% milk or low-fat yogurt, cheese, and cottage cheese  · Limit unhealthy fats  such as butter, hard margarine, and shortening  Exercise:  Exercise at least 30 minutes per day on most days of the week  Some examples of exercise include walking, biking, dancing, and swimming  You can also fit in more physical activity by taking the stairs instead of the elevator or parking farther away from stores  Include muscle strengthening activities 2 days each week  Regular exercise provides many health benefits   It helps you manage your weight, and decreases your risk for type 2 diabetes, heart disease, stroke, and high blood pressure  Exercise can also help improve your mood  Ask your healthcare provider about the best exercise plan for you  General health and safety guidelines:   · Do not smoke  Nicotine and other chemicals in cigarettes and cigars can cause lung damage  Ask your healthcare provider for information if you currently smoke and need help to quit  E-cigarettes or smokeless tobacco still contain nicotine  Talk to your healthcare provider before you use these products  · Limit alcohol  A drink of alcohol is 12 ounces of beer, 5 ounces of wine, or 1½ ounces of liquor  · Lose weight, if needed  Being overweight increases your risk of certain health conditions  These include heart disease, high blood pressure, type 2 diabetes, and certain types of cancer  · Protect your skin  Do not sunbathe or use tanning beds  Use sunscreen with a SPF 15 or higher  Apply sunscreen at least 15 minutes before you go outside  Reapply sunscreen every 2 hours  Wear protective clothing, hats, and sunglasses when you are outside  · Drive safely  Always wear your seatbelt  Make sure everyone in your car wears a seatbelt  A seatbelt can save your life if you are in an accident  Do not use your cell phone when you are driving  This could distract you and cause an accident  Pull over if you need to make a call or send a text message  · Practice safe sex  Use latex condoms if are sexually active and have more than one partner  Your healthcare provider may recommend screening tests for sexually transmitted infections (STIs)  · Wear helmets, lifejackets, and protective gear  Always wear a helmet when you ride a bike or motorcycle, go skiing, or play sports that could cause a head injury  Wear protective equipment when you play sports  Wear a lifejacket when you are on a boat or doing water sports      © Copyright [employer] 2020 Information is for End User's use only and may not be sold, redistributed or otherwise used for commercial purposes  All illustrations and images included in CareNotes® are the copyrighted property of A D A M [employer] Inc  or Daniela Luis  The above information is an  only  It is not intended as medical advice for individual conditions or treatments  Talk to your doctor, nurse or pharmacist before following any medical regimen to see if it is safe and effective for you  Cholesterol and Your Health   AMBULATORY CARE:   Cholesterol  is a waxy, fat-like substance  Your body uses cholesterol to make hormones and new cells, and to protect nerves  Cholesterol is made by your body  It also comes from certain foods you eat, such as meat and dairy products  Your healthcare provider can help you set goals for your cholesterol levels  He or she can help you create a plan to meet your goals  Cholesterol level goals: Your cholesterol level goals depend on your risk for heart disease, your age, and your other health conditions  The following are general guidelines:  · Total cholesterol  includes low-density lipoprotein (LDL), high-density lipoprotein (HDL), and triglyceride levels  The total cholesterol level should be lower than 200 mg/dL and is best at about 150 mg/dL  · LDL cholesterol  is called bad cholesterol  because it forms plaque in your arteries  As plaque builds up, your arteries become narrow, and less blood flows through  When plaque decreases blood flow to your heart, you may have chest pain  If plaque completely blocks an artery that brings blood to your heart, you may have a heart attack  Plaque can break off and form blood clots  Blood clots may block arteries in your brain and cause a stroke  The level should be less than 130 mg/dL and is best at about 100 mg/dL  · HDL cholesterol  is called good cholesterol  because it helps remove LDL cholesterol from your arteries   It does this by attaching to LDL cholesterol and carrying it to your liver  Your liver breaks down LDL cholesterol so your body can get rid of it  High levels of HDL cholesterol can help prevent a heart attack and stroke  Low levels of HDL cholesterol can increase your risk for heart disease, heart attack, and stroke  The level should be 60 mg/dL or higher  · Triglycerides  are a type of fat that store energy from foods you eat  High levels of triglycerides also cause plaque buildup  This can increase your risk for a heart attack or stroke  If your triglyceride level is high, your LDL cholesterol level may also be high  The level should be less than 150 mg/dL  What increases your risk for high cholesterol:   · Smoking cigarettes    · Being overweight or obese, or not getting enough exercise    · Drinking large amounts of alcohol    · A medical condition such as hypertension (high blood pressure) or diabetes    · Certain genes passed from your parents to you    · Age older than 65 years    What you need to know about having your cholesterol levels checked: Adults 21to 39years of age should have their cholesterol levels checked every 4 to 6 years  Adults 45 years or older should have their cholesterol checked every 1 to 2 years  You may need your cholesterol checked more often, or at a younger age, if you have risk factors for heart disease  You may also need to have your cholesterol checked more often if you have other health conditions, such as diabetes  Blood tests are used to check cholesterol levels  Blood tests measure your levels of triglycerides, LDL cholesterol, and HDL cholesterol  How healthy fats affect your cholesterol levels:  Healthy fats, also called unsaturated fats, help lower LDL cholesterol and triglyceride levels  Healthy fats include the following:  · Monounsaturated fats  are found in foods such as olive oil, canola oil, avocado, nuts, and olives      · Polyunsaturated fats,  such as omega 3 fats, are found in fish, such as salmon, trout, and tuna  They can also be found in plant foods such as flaxseed, walnuts, and soybeans  How unhealthy fats affect your cholesterol levels:  Unhealthy fats increase LDL cholesterol and triglyceride levels  They are found in foods high in cholesterol, saturated fat, and trans fat:  · Cholesterol  is found in eggs, dairy, and meat  · Saturated fat  is found in butter, cheese, ice cream, whole milk, and coconut oil  Saturated fat is also found in meat, such as sausage, hot dogs, and bologna  · Trans fat  is found in liquid oils and is used in fried and baked foods  Foods that contain trans fats include chips, crackers, muffins, sweet rolls, microwave popcorn, and cookies  Treatment  for high cholesterol will also decrease your risk of heart disease, heart attack, and stroke  Treatment may include any of the following:  · Lifestyle changes  may include food, exercise, weight loss, and quitting smoking  You may also need to decrease the amount of alcohol you drink  Your healthcare provider will want you to start with lifestyle changes  Other treatment may be added if lifestyle changes are not enough  · Medicines  may be given to lower your LDL cholesterol, triglyceride levels, or total cholesterol level  You may need medicines to lower your cholesterol if any of the following is true:     ? You have a history of stroke, TIA, unstable angina, or a heart attack  ? Your LDL cholesterol level is 190 mg/dL or higher  ? You are age 36 to 76 years, have diabetes or heart disease risk factors, and your LDL cholesterol is 70 mg/dL or higher  · Supplements  include fish oil, red yeast rice, and garlic  Fish oil may help lower your triglyceride and LDL cholesterol levels  It may also increase your HDL cholesterol level  Red yeast rice may help decrease your total cholesterol level and LDL cholesterol level  Garlic may help lower your total cholesterol level   Do not take these supplements without talking to your healthcare provider  Food changes you can make to lower your cholesterol levels:  A dietitian can help you create a healthy eating plan  He or she can show you how to read food labels and choose foods low in saturated fat, trans fats, and cholesterol  · Decrease the total amount of fat you eat  Choose lean meats, fat-free or 1% fat milk, and low-fat dairy products, such as yogurt and cheese  Try to limit or avoid red meats  Limit or do not eat fried foods or baked goods, such as cookies  · Replace unhealthy fats with healthy fats  Cook foods in olive oil or canola oil  Choose soft margarines that are low in saturated fat and trans fat  Seeds, nuts, and avocados are other examples of healthy fats  · Eat foods with omega-3 fats  Examples include salmon, tuna, mackerel, walnuts, and flaxseed  Eat fish 2 times per week  Pregnant women should not eat fish that have high levels of mercury, such as shark, swordfish, and zofia mackerel  · Increase the amount of high-fiber foods you eat  High-fiber foods can help lower your LDL cholesterol  Aim to get between 20 and 30 grams of fiber each day  Fruits and vegetables are high in fiber  Eat at least 5 servings each day  Other high-fiber foods are whole-grain or whole-wheat breads, pastas, or cereals, and brown rice  Eat 3 ounces of whole-grain foods each day  Increase fiber slowly  You may have abdominal discomfort, bloating, and gas if you add fiber to your diet too quickly  · Eat healthy protein foods  Examples include low-fat dairy products, skinless chicken and turkey, fish, and nuts  · Limit foods and drinks that are high in sugar  Your dietitian or healthcare provider can help you create daily limits for high-sugar foods and drinks  The limit may be lower if you have diabetes or another health condition  Limits can also help you lose weight if needed    Lifestyle changes you can make to lower your cholesterol levels:   · Maintain a healthy weight  Ask your healthcare provider what a healthy weight is for you  Ask him or her to help you create a weight loss plan if needed  Weight loss can decrease your total cholesterol and triglyceride levels  Weight loss may also help keep your blood pressure at a healthy level  · Exercise regularly  Exercise can help lower your total cholesterol level and maintain a healthy weight  Exercise can also help increase your HDL cholesterol level  Work with your healthcare provider to create an exercise program that is right for you  Get at least 30 to 40 minutes of moderate exercise most days of the week  Examples of exercise include brisk walking, swimming, or biking  · Do not smoke  Nicotine and other chemicals in cigarettes and cigars can raise your cholesterol levels  Ask your healthcare provider for information if you currently smoke and need help to quit  E-cigarettes or smokeless tobacco still contain nicotine  Talk to your healthcare provider before you use these products  · Limit or do not drink alcohol  Alcohol can increase your triglyceride levels  Ask your healthcare provider before you drink alcohol  Ask how much is okay for you to drink in 1 day or 1 week  © Copyright [address] Information is for End User's use only and may not be sold, redistributed or otherwise used for commercial purposes  All illustrations and images included in CareNotes® are the copyrighted property of [employer] M , Inc  or [address] [place], [place] [address]angie   The above information is an  only  It is not intended as medical advice for individual conditions or treatments  Talk to your doctor, nurse or pharmacist before following any medical regimen to see if it is safe and effective for you

## 2021-05-08 ENCOUNTER — TELEPHONE (OUTPATIENT)
Dept: FAMILY MEDICINE CLINIC | Facility: CLINIC | Age: 31
End: 2021-05-08

## 2021-05-08 DIAGNOSIS — E03.9 HYPOTHYROIDISM (ACQUIRED): Primary | ICD-10-CM

## 2021-05-08 NOTE — TELEPHONE ENCOUNTER
T/c from patient stating she was seen on 5/6/21 by Renae and the medication refill did not go through for her synthroid     Please send in rx to [facility] in [address] [facility]    [phone]

## 2021-05-08 NOTE — TELEPHONE ENCOUNTER
After review of chart it looks as though no levothyroxine has been prescribed for patient since 2018  Routed to [facility] to send proper script to [address] [place] [address]

## 2021-05-09 RX ORDER — LEVOTHYROXINE SODIUM 0.03 MG/1
25 TABLET ORAL DAILY
Qty: 30 TABLET | Refills: 0 | Status: SHIPPED | OUTPATIENT
Start: 2021-05-09 | End: 2021-05-26 | Stop reason: ALTCHOICE

## 2021-05-26 ENCOUNTER — TELEPHONE (OUTPATIENT)
Dept: FAMILY MEDICINE CLINIC | Facility: CLINIC | Age: 31
End: 2021-05-26

## 2021-05-26 DIAGNOSIS — E03.9 HYPOTHYROIDISM (ACQUIRED): ICD-10-CM

## 2021-05-26 DIAGNOSIS — E03.9 ACQUIRED HYPOTHYROIDISM: Primary | ICD-10-CM

## 2021-05-26 RX ORDER — LEVOTHYROXINE SODIUM 0.05 MG/1
50 TABLET ORAL DAILY
Qty: 30 TABLET | Refills: 0 | Status: SHIPPED | OUTPATIENT
Start: 2021-05-26 | End: 2021-06-14

## 2021-05-26 NOTE — TELEPHONE ENCOUNTER
Patient called, she states that she has been taking 2 Levothyroxine 25mcg tablets since 05/20/2021  She is currently out of her Levothyroxine and states there are no refills on it so she was unsure if she is suppose to have her thyroid level rechecked first or if you're going to change the dose  She also notes that she has been experiencing heart palpitations since she started Levothyroxine earlier this month and yesterday she had some "mood swings", feeling like she was going to "crawl out of her skin and cry"  She is unsure if this is related to the Levothyroxine she is on- she said that her bottle says "Euthyrox" not Levothyroxine or if it was just an "off day" since she has not felt like this today  Please advise, also refill medication if appropriate

## 2021-05-26 NOTE — TELEPHONE ENCOUNTER
Patient returned call- notified Levothyroxine 50mcg sent and to recheck labs in 1 week  Patient expressed understanding

## 2021-06-14 ENCOUNTER — APPOINTMENT (OUTPATIENT)
Dept: LAB | Facility: HOSPITAL | Age: 31
End: 2021-06-14
Payer: COMMERCIAL

## 2021-06-14 ENCOUNTER — TELEPHONE (OUTPATIENT)
Dept: FAMILY MEDICINE CLINIC | Facility: CLINIC | Age: 31
End: 2021-06-14

## 2021-06-14 DIAGNOSIS — E03.9 ACQUIRED HYPOTHYROIDISM: ICD-10-CM

## 2021-06-14 DIAGNOSIS — E03.9 HYPOTHYROIDISM (ACQUIRED): ICD-10-CM

## 2021-06-14 DIAGNOSIS — E03.9 ACQUIRED HYPOTHYROIDISM: Primary | ICD-10-CM

## 2021-06-14 LAB
T4 FREE SERPL-MCNC: 0.61 NG/DL (ref 0.76–1.46)
TSH SERPL DL<=0.05 MIU/L-ACNC: 252.43 UIU/ML (ref 0.36–3.74)

## 2021-06-14 PROCEDURE — 84439 ASSAY OF FREE THYROXINE: CPT

## 2021-06-14 PROCEDURE — 84443 ASSAY THYROID STIM HORMONE: CPT

## 2021-06-14 PROCEDURE — 36415 COLL VENOUS BLD VENIPUNCTURE: CPT

## 2021-06-14 RX ORDER — LEVOTHYROXINE SODIUM 0.1 MG/1
100 TABLET ORAL DAILY
Qty: 30 TABLET | Refills: 2 | Status: SHIPPED | OUTPATIENT
Start: 2021-06-14 | End: 2021-08-30 | Stop reason: SDUPTHER

## 2021-06-14 NOTE — TELEPHONE ENCOUNTER
----- Message from Marcel Garcia PA-C sent at 6/14/2021  1:43 PM EDT -----  Pt needs to increase thyroid medication to 100 mcg daily, after 4 weeks increase to 150 mcg  Recheck labs in 2 months, order in chart

## 2021-08-30 DIAGNOSIS — E03.9 HYPOTHYROIDISM (ACQUIRED): ICD-10-CM

## 2021-08-30 RX ORDER — LEVOTHYROXINE SODIUM 0.1 MG/1
100 TABLET ORAL DAILY
Qty: 30 TABLET | Refills: 2 | Status: SHIPPED | OUTPATIENT
Start: 2021-08-30 | End: 2021-09-17 | Stop reason: SDUPTHER
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
## 2021-09-16 ENCOUNTER — APPOINTMENT (OUTPATIENT)
Dept: LAB | Facility: HOSPITAL | Age: 31
End: 2021-09-16
Payer: COMMERCIAL

## 2021-09-16 DIAGNOSIS — E03.9 ACQUIRED HYPOTHYROIDISM: ICD-10-CM

## 2021-09-16 LAB — TSH SERPL DL<=0.05 MIU/L-ACNC: 2.19 UIU/ML (ref 0.36–3.74)

## 2021-09-16 PROCEDURE — 84443 ASSAY THYROID STIM HORMONE: CPT

## 2021-09-16 PROCEDURE — 36415 COLL VENOUS BLD VENIPUNCTURE: CPT

## 2021-09-17 DIAGNOSIS — E03.9 HYPOTHYROIDISM (ACQUIRED): ICD-10-CM

## 2021-09-17 RX ORDER — LEVOTHYROXINE SODIUM 0.1 MG/1
100 TABLET ORAL DAILY
Qty: 30 TABLET | Refills: 2 | Status: SHIPPED | OUTPATIENT
Start: 2021-09-17 | End: 2022-03-21 | Stop reason: SDUPTHER

## 2021-09-20 ENCOUNTER — OFFICE VISIT (OUTPATIENT)
Dept: FAMILY MEDICINE CLINIC | Facility: CLINIC | Age: 31
End: 2021-09-20
Payer: COMMERCIAL

## 2021-09-20 VITALS
TEMPERATURE: 98.3 F | OXYGEN SATURATION: 99 % | HEIGHT: 63 IN | BODY MASS INDEX: 32.07 KG/M2 | SYSTOLIC BLOOD PRESSURE: 112 MMHG | HEART RATE: 82 BPM | WEIGHT: 181 LBS | DIASTOLIC BLOOD PRESSURE: 68 MMHG

## 2021-09-20 DIAGNOSIS — L03.113 CELLULITIS OF RIGHT UPPER EXTREMITY: Primary | ICD-10-CM

## 2021-09-20 PROCEDURE — 3008F BODY MASS INDEX DOCD: CPT | Performed by: PHYSICIAN ASSISTANT

## 2021-09-20 PROCEDURE — 1036F TOBACCO NON-USER: CPT | Performed by: PHYSICIAN ASSISTANT

## 2021-09-20 PROCEDURE — 99213 OFFICE O/P EST LOW 20 MIN: CPT | Performed by: PHYSICIAN ASSISTANT

## 2021-09-20 PROCEDURE — 3725F SCREEN DEPRESSION PERFORMED: CPT | Performed by: PHYSICIAN ASSISTANT

## 2021-09-20 RX ORDER — DOXYCYCLINE HYCLATE 100 MG/1
100 CAPSULE ORAL EVERY 12 HOURS SCHEDULED
Qty: 20 CAPSULE | Refills: 0 | Status: SHIPPED | OUTPATIENT
Start: 2021-09-20 | End: 2021-09-30

## 2021-09-20 NOTE — PROGRESS NOTES
Assessment/Plan:          Diagnoses and all orders for this visit:    Cellulitis of right upper extremity  -     doxycycline hyclate (VIBRAMYCIN) 100 mg capsule; Take 1 capsule (100 mg total) by mouth every 12 (twelve) hours for 10 days            Subjective:      Patient ID: Naomi Interiano is a 31 y o  female  Arm Injury   The incident occurred 3 to 5 days ago  The incident occurred in the yard  Injury mechanism: wounded on a fence  The pain is present in the upper right arm  The quality of the pain is described as aching  The pain radiates to the right arm  The pain is moderate  The pain has been fluctuating since the incident  Pertinent negatives include no chest pain, muscle weakness, numbness or tingling  The symptoms are aggravated by palpation  Treatments tried: silvadene cream  The treatment provided mild relief  The following portions of the patient's history were reviewed and updated as appropriate:   She has no past medical history on file  ,  does not have any pertinent problems on file  ,   has no past surgical history on file  ,  family history is not on file  ,   reports that she has never smoked  She has never used smokeless tobacco  No history on file for alcohol use and drug use ,  is allergic to other and penicillins     Current Outpatient Medications   Medication Sig Dispense Refill    levothyroxine 100 mcg tablet Take 1 tablet (100 mcg total) by mouth daily 30 tablet 2    doxycycline hyclate (VIBRAMYCIN) 100 mg capsule Take 1 capsule (100 mg total) by mouth every 12 (twelve) hours for 10 days 20 capsule 0     No current facility-administered medications for this visit  Review of Systems   Constitutional: Negative for chills and fever  Cardiovascular: Negative for chest pain  Skin: Positive for wound  Neurological: Negative for tingling and numbness           Objective:  Vitals:    09/20/21 1421   BP: 112/68   BP Location: Left arm   Patient Position: Sitting   Cuff Size: Adult   Pulse: 82   Temp: 98 3 °F (36 8 °C)   TempSrc: Tympanic   SpO2: 99%   Weight: 82 1 kg (181 lb)   Height: 5' 3" (1 6 m)     Body mass index is 32 06 kg/m²  Physical Exam  Vitals and nursing note reviewed  Constitutional:       Appearance: Normal appearance  HENT:      Head: Normocephalic and atraumatic  Eyes:      Conjunctiva/sclera: Conjunctivae normal    Cardiovascular:      Rate and Rhythm: Normal rate  Pulmonary:      Effort: Pulmonary effort is normal    Skin:     Findings: Wound present  Neurological:      Mental Status: She is alert and oriented to person, place, and time     Psychiatric:         Mood and Affect: Mood normal          Behavior: Behavior normal

## 2022-03-21 DIAGNOSIS — E03.9 HYPOTHYROIDISM (ACQUIRED): ICD-10-CM

## 2022-03-21 RX ORDER — LEVOTHYROXINE SODIUM 0.1 MG/1
100 TABLET ORAL DAILY
Qty: 30 TABLET | Refills: 2 | Status: SHIPPED | OUTPATIENT
Start: 2022-03-21 | End: 2022-07-01 | Stop reason: SDUPTHER

## 2022-06-09 ENCOUNTER — OFFICE VISIT (OUTPATIENT)
Dept: FAMILY MEDICINE CLINIC | Facility: CLINIC | Age: 32
End: 2022-06-09
Payer: COMMERCIAL

## 2022-06-09 ENCOUNTER — APPOINTMENT (OUTPATIENT)
Dept: LAB | Facility: HOSPITAL | Age: 32
End: 2022-06-09
Payer: COMMERCIAL

## 2022-06-09 VITALS
BODY MASS INDEX: 32.78 KG/M2 | HEART RATE: 83 BPM | WEIGHT: 185 LBS | OXYGEN SATURATION: 99 % | SYSTOLIC BLOOD PRESSURE: 114 MMHG | DIASTOLIC BLOOD PRESSURE: 76 MMHG | HEIGHT: 63 IN | TEMPERATURE: 97.7 F

## 2022-06-09 DIAGNOSIS — R53.83 FATIGUE, UNSPECIFIED TYPE: ICD-10-CM

## 2022-06-09 DIAGNOSIS — E03.9 ACQUIRED HYPOTHYROIDISM: ICD-10-CM

## 2022-06-09 DIAGNOSIS — Z00.00 ANNUAL PHYSICAL EXAM: Primary | ICD-10-CM

## 2022-06-09 PROBLEM — J06.9 UPPER RESPIRATORY INFECTION: Status: RESOLVED | Noted: 2018-04-09 | Resolved: 2022-06-09

## 2022-06-09 PROBLEM — N64.3 GALACTORRHEA OF BOTH BREASTS: Status: RESOLVED | Noted: 2021-05-06 | Resolved: 2022-06-09

## 2022-06-09 PROBLEM — N92.6 ABNORMAL MENSES: Status: RESOLVED | Noted: 2021-05-06 | Resolved: 2022-06-09

## 2022-06-09 LAB
ALBUMIN SERPL BCP-MCNC: 3.9 G/DL (ref 3.5–5)
ALP SERPL-CCNC: 79 U/L (ref 46–116)
ALT SERPL W P-5'-P-CCNC: 14 U/L (ref 12–78)
ANION GAP SERPL CALCULATED.3IONS-SCNC: 7 MMOL/L (ref 4–13)
AST SERPL W P-5'-P-CCNC: 17 U/L (ref 5–45)
BASOPHILS # BLD AUTO: 0.08 THOUSANDS/ΜL (ref 0–0.1)
BASOPHILS NFR BLD AUTO: 1 % (ref 0–1)
BILIRUB SERPL-MCNC: 0.65 MG/DL (ref 0.2–1)
BUN SERPL-MCNC: 14 MG/DL (ref 5–25)
CALCIUM SERPL-MCNC: 9.6 MG/DL (ref 8.3–10.1)
CHLORIDE SERPL-SCNC: 103 MMOL/L (ref 100–108)
CO2 SERPL-SCNC: 30 MMOL/L (ref 21–32)
CREAT SERPL-MCNC: 0.82 MG/DL (ref 0.6–1.3)
EOSINOPHIL # BLD AUTO: 0.13 THOUSAND/ΜL (ref 0–0.61)
EOSINOPHIL NFR BLD AUTO: 1 % (ref 0–6)
ERYTHROCYTE [DISTWIDTH] IN BLOOD BY AUTOMATED COUNT: 11.8 % (ref 11.6–15.1)
GFR SERPL CREATININE-BSD FRML MDRD: 94 ML/MIN/1.73SQ M
GLUCOSE SERPL-MCNC: 95 MG/DL (ref 65–140)
HCT VFR BLD AUTO: 37.7 % (ref 34.8–46.1)
HGB BLD-MCNC: 12.8 G/DL (ref 11.5–15.4)
IMM GRANULOCYTES # BLD AUTO: 0.04 THOUSAND/UL (ref 0–0.2)
IMM GRANULOCYTES NFR BLD AUTO: 0 % (ref 0–2)
LYMPHOCYTES # BLD AUTO: 2.48 THOUSANDS/ΜL (ref 0.6–4.47)
LYMPHOCYTES NFR BLD AUTO: 25 % (ref 14–44)
MCH RBC QN AUTO: 31.9 PG (ref 26.8–34.3)
MCHC RBC AUTO-ENTMCNC: 34 G/DL (ref 31.4–37.4)
MCV RBC AUTO: 94 FL (ref 82–98)
MONOCYTES # BLD AUTO: 0.84 THOUSAND/ΜL (ref 0.17–1.22)
MONOCYTES NFR BLD AUTO: 8 % (ref 4–12)
NEUTROPHILS # BLD AUTO: 6.52 THOUSANDS/ΜL (ref 1.85–7.62)
NEUTS SEG NFR BLD AUTO: 65 % (ref 43–75)
NRBC BLD AUTO-RTO: 0 /100 WBCS
PLATELET # BLD AUTO: 433 THOUSANDS/UL (ref 149–390)
PMV BLD AUTO: 10.1 FL (ref 8.9–12.7)
POTASSIUM SERPL-SCNC: 4.1 MMOL/L (ref 3.5–5.3)
PROT SERPL-MCNC: 7.8 G/DL (ref 6.4–8.2)
RBC # BLD AUTO: 4.01 MILLION/UL (ref 3.81–5.12)
SODIUM SERPL-SCNC: 140 MMOL/L (ref 136–145)
TSH SERPL DL<=0.05 MIU/L-ACNC: 3.51 UIU/ML (ref 0.45–4.5)
WBC # BLD AUTO: 10.09 THOUSAND/UL (ref 4.31–10.16)

## 2022-06-09 PROCEDURE — 84480 ASSAY TRIIODOTHYRONINE (T3): CPT

## 2022-06-09 PROCEDURE — 80053 COMPREHEN METABOLIC PANEL: CPT

## 2022-06-09 PROCEDURE — 84439 ASSAY OF FREE THYROXINE: CPT

## 2022-06-09 PROCEDURE — 84443 ASSAY THYROID STIM HORMONE: CPT

## 2022-06-09 PROCEDURE — 99395 PREV VISIT EST AGE 18-39: CPT | Performed by: PHYSICIAN ASSISTANT

## 2022-06-09 PROCEDURE — 85025 COMPLETE CBC W/AUTO DIFF WBC: CPT

## 2022-06-09 PROCEDURE — 36415 COLL VENOUS BLD VENIPUNCTURE: CPT

## 2022-06-09 NOTE — PROGRESS NOTES
[address]     NAME: Jarocho Berman  AGE: 27 y o  SEX: female  : 1990     DATE: 2022     Assessment and Plan:     Problem List Items Addressed This Visit        Endocrine    Hypothyroidism    Relevant Orders    TSH, 3rd generation    T4, free    T3       Other    Annual physical exam - Primary      Other Visit Diagnoses     Fatigue, unspecified type        Relevant Orders    CBC and differential    Comprehensive metabolic panel          Immunizations and preventive care screenings were discussed with patient today  Appropriate education was printed on patient's after visit summary  Counseling:  Dental Health: discussed importance of regular tooth brushing, flossing, and dental visits  Injury prevention: discussed safety/seat belts, safety helmets, smoke detectors, carbon dioxide detectors, and smoking near bedding or upholstery  · Exercise: the importance of regular exercise/physical activity was discussed  Recommend exercise 3-5 times per week for at least 30 minutes  BMI Counseling: Body mass index is 32 77 kg/m²  The BMI is above normal  Nutrition recommendations include encouraging healthy choices of fruits and vegetables, consuming healthier snacks, limiting drinks that contain sugar, moderation in carbohydrate intake, increasing intake of lean protein, reducing intake of saturated and trans fat and reducing intake of cholesterol  Exercise recommendations include moderate physical activity 150 minutes/week  No pharmacotherapy was ordered  Rationale for BMI follow-up plan is due to patient being overweight or obese  Depression Screening and Follow-up Plan: Patient was screened for depression during today's encounter  They screened negative with a PHQ-2 score of 0  Return in 6 months (on 2022)       Chief Complaint:     Chief Complaint   Patient presents with   [facility] Physical Exam     Pt is due for an annual physical exam      Thyroid Problem     Pt states "I think my thyroid is off " Pt states for the past 10 days she has been experiencing some fatigue, irritability, and abnormal heart rhythm  History of Present Illness:     Adult Annual Physical   Patient here for a comprehensive physical exam  The patient reports problems - feels off with irritability, dizziness and fatigue  Diet and Physical Activity  · Diet/Nutrition: well balanced diet  · Exercise: moderate cardiovascular exercise  Depression Screening  PHQ-2/9 Depression Screening    Little interest or pleasure in doing things: 0 - not at all  Feeling down, depressed, or hopeless: 0 - not at all  PHQ-2 Score: 0  PHQ-2 Interpretation: Negative depression screen       General Health  · Sleep: sleeps well  · Hearing: normal - bilateral   · Vision: no vision problems and most recent eye exam >1 year ago  · Dental: no dental visits for >1 year  /GYN Health  · Last menstrual period: 05/16/2022  · Contraceptive method: none  · History of STDs?: no      Review of Systems:     Review of Systems   Constitutional: Positive for fatigue  Negative for appetite change, fever and unexpected weight change  HENT: Negative for dental problem, ear pain, hearing loss, mouth sores, nosebleeds, rhinorrhea and trouble swallowing  Eyes: Negative for photophobia, pain, discharge and visual disturbance  Respiratory: Negative for cough, chest tightness, shortness of breath and wheezing  Cardiovascular: Negative for chest pain and palpitations  Gastrointestinal: Negative for abdominal pain, blood in stool, constipation, diarrhea, nausea, rectal pain and vomiting  Endocrine: Negative for cold intolerance, polydipsia, polyphagia and polyuria  Genitourinary: Negative for decreased urine volume, difficulty urinating, dysuria, enuresis, frequency, genital sores, hematuria and urgency     Musculoskeletal: Negative for arthralgias, back pain, gait problem, joint swelling, myalgias, neck pain and neck stiffness  Skin: Negative for color change and rash  Allergic/Immunologic: Negative for environmental allergies, food allergies and immunocompromised state  Neurological: Positive for dizziness  Negative for seizures, speech difficulty, light-headedness and headaches  Hematological: Negative for adenopathy  Does not bruise/bleed easily  Psychiatric/Behavioral: Negative for behavioral problems, confusion, decreased concentration, dysphoric mood, self-injury, sleep disturbance and suicidal ideas  The patient is not nervous/anxious and is not hyperactive  Past Medical History:     History reviewed  No pertinent past medical history  Past Surgical History:     History reviewed  No pertinent surgical history  Social History:     Social History     Socioeconomic History    Marital status: Single     Spouse name: None    Number of children: None    Years of education: None    Highest education level: None   Occupational History    None   Tobacco Use    Smoking status: Never Smoker    Smokeless tobacco: Never Used   Vaping Use    Vaping Use: Never used   Substance and Sexual Activity    Alcohol use: None    Drug use: None    Sexual activity: None   Other Topics Concern    None   Social History Narrative    None     Social Determinants of Health     Financial Resource Strain: Not on file   Food Insecurity: Not on file   Transportation Needs: Not on file   Physical Activity: Not on file   Stress: Not on file   Social Connections: Not on file   Intimate Partner Violence: Not on file   Housing Stability: Not on file      Family History:     History reviewed  No pertinent family history     Current Medications:     Current Outpatient Medications   Medication Sig Dispense Refill    levothyroxine 100 mcg tablet Take 1 tablet (100 mcg total) by mouth daily 30 tablet 2     No current facility-administered medications for this visit  Allergies: Allergies   Allergen Reactions    Other Anaphylaxis     Green Peppers    Penicillins Hives      Physical Exam:     /76 (BP Location: Left arm, Patient Position: Sitting, Cuff Size: Adult)   Pulse 83   Temp 97 7 °F (36 5 °C) (Tympanic)   Ht 5' 3" (1 6 m)   Wt 83 9 kg (185 lb)   SpO2 99%   BMI 32 77 kg/m²     Physical Exam  Vitals and nursing note reviewed  Constitutional:       Appearance: Normal appearance  HENT:      Head: Normocephalic and atraumatic  Right Ear: Tympanic membrane, ear canal and external ear normal       Left Ear: Tympanic membrane, ear canal and external ear normal       Nose: Nose normal       Mouth/Throat:      Mouth: Mucous membranes are moist       Pharynx: Oropharynx is clear  Eyes:      Extraocular Movements: Extraocular movements intact  Conjunctiva/sclera: Conjunctivae normal    Neck:      Thyroid: No thyromegaly  Vascular: No carotid bruit  Cardiovascular:      Rate and Rhythm: Normal rate and regular rhythm  Pulses: Normal pulses  Heart sounds: Normal heart sounds  Pulmonary:      Effort: Pulmonary effort is normal       Breath sounds: Normal breath sounds  Abdominal:      General: Abdomen is flat  Bowel sounds are normal       Palpations: Abdomen is soft  There is no hepatomegaly, splenomegaly or mass  Tenderness: There is no abdominal tenderness  There is no right CVA tenderness or left CVA tenderness  Musculoskeletal:         General: Normal range of motion  Cervical back: Normal range of motion and neck supple  Right lower leg: No edema  Left lower leg: No edema  Lymphadenopathy:      Cervical: No cervical adenopathy  Skin:     General: Skin is warm and dry  Neurological:      General: No focal deficit present  Mental Status: She is alert and oriented to person, place, and time     Psychiatric:         Mood and Affect: Mood normal          Behavior: Behavior normal          Thought Content:  Thought content normal          Judgment: Judgment normal           Colton Mckeon PA-C   [address] [place] [address]

## 2022-06-09 NOTE — PATIENT INSTRUCTIONS
Wellness Visit for Adults   AMBULATORY CARE:   A wellness visit  is when you see your healthcare provider to get screened for health problems  Your healthcare provider will also give you advice on how to stay healthy  Write down your questions so you remember to ask them  Ask your healthcare provider how often you should have a wellness visit  What happens at a wellness visit:  Your healthcare provider will ask about your health, and your family history of health problems  This includes high blood pressure, heart disease, and cancer  He or she will ask if you have symptoms that concern you, if you smoke, and about your mood  You may also be asked about your intake of medicines, supplements, food, and alcohol  Any of the following may be done:  · Your weight  will be checked  Your height may also be checked so your body mass index (BMI) can be calculated  Your BMI shows if you are at a healthy weight  · Your blood pressure  and heart rate will be checked  Your temperature may also be checked  · Blood and urine tests  may be done  Blood tests may be done to check your cholesterol levels  Abnormal cholesterol levels increase your risk for heart disease and stroke  You may also need a blood or urine test to check for diabetes if you are at increased risk  Urine tests may be done to look for signs of an infection or kidney disease  · A physical exam  includes checking your heartbeat and lungs with a stethoscope  Your healthcare provider may also check your skin to look for sun damage  · Screening tests  may be recommended  A screening test is done to check for diseases that may not cause symptoms  The screening tests you may need depend on your age, gender, family history, and lifestyle habits  For example, colorectal screening may be recommended if you are 48years old or older  Screening tests you need if you are a woman:   · A Pap smear  is used to screen for cervical cancer   Pap smears are usually done every 3 to 5 years depending on your age  You may need them more often if you have had abnormal Pap smear test results in the past  Ask your healthcare provider how often you should have a Pap smear  · A mammogram  is an x-ray of your breasts to screen for breast cancer  Experts recommend mammograms every 2 years starting at age 48 years  You may need a mammogram at age 52 years or younger if you have an increased risk for breast cancer  Talk to your healthcare provider about when you should start having mammograms and how often you need them  Vaccines you may need:   · Get an influenza vaccine  every year  The influenza vaccine protects you from the flu  Several types of viruses cause the flu  The viruses change over time, so new vaccines are made each year  · Get a tetanus-diphtheria (Td) booster vaccine  every 10 years  This vaccine protects you against tetanus and diphtheria  Tetanus is a severe infection that may cause painful muscle spasms and lockjaw  Diphtheria is a severe bacterial infection that causes a thick covering in the back of your mouth and throat  · Get a human papillomavirus (HPV) vaccine  if you are female and aged 23 to 32 or male 23 to 24 and never received it  This vaccine protects you from HPV infection  HPV is the most common infection spread by sexual contact  HPV may also cause vaginal, penile, and anal cancers  · Get a pneumococcal vaccine  if you are aged 72 years or older  The pneumococcal vaccine is an injection given to protect you from pneumococcal disease  Pneumococcal disease is an infection caused by pneumococcal bacteria  The infection may cause pneumonia, meningitis, or an ear infection  · Get a shingles vaccine  if you are 60 or older, even if you have had shingles before  The shingles vaccine is an injection to protect you from the varicella-zoster virus  This is the same virus that causes chickenpox   Shingles is a painful rash that develops in people who had chickenpox or have been exposed to the virus  How to eat healthy:  My Plate is a model for planning healthy meals  It shows the types and amounts of foods that should go on your plate  Fruits and vegetables make up about half of your plate, and grains and protein make up the other half  A serving of dairy is included on the side of your plate  The amount of calories and serving sizes you need depends on your age, gender, weight, and height  Examples of healthy foods are listed below:  · Eat a variety of vegetables  such as dark green, red, and orange vegetables  You can also include canned vegetables low in sodium (salt) and frozen vegetables without added butter or sauces  · Eat a variety of fresh fruits , canned fruit in 100% juice, frozen fruit, and dried fruit  · Include whole grains  At least half of the grains you eat should be whole grains  Examples include whole-wheat bread, wheat pasta, brown rice, and whole-grain cereals such as oatmeal     · Eat a variety of protein foods such as seafood (fish and shellfish), lean meat, and poultry without skin (turkey and chicken)  Examples of lean meats include pork leg, shoulder, or tenderloin, and beef round, sirloin, tenderloin, and extra lean ground beef  Other protein foods include eggs and egg substitutes, beans, peas, soy products, nuts, and seeds  · Choose low-fat dairy products such as skim or 1% milk or low-fat yogurt, cheese, and cottage cheese  · Limit unhealthy fats  such as butter, hard margarine, and shortening  Exercise:  Exercise at least 30 minutes per day on most days of the week  Some examples of exercise include walking, biking, dancing, and swimming  You can also fit in more physical activity by taking the stairs instead of the elevator or parking farther away from stores  Include muscle strengthening activities 2 days each week  Regular exercise provides many health benefits   It helps you manage your weight, and decreases your risk for type 2 diabetes, heart disease, stroke, and high blood pressure  Exercise can also help improve your mood  Ask your healthcare provider about the best exercise plan for you  General health and safety guidelines:   · Do not smoke  Nicotine and other chemicals in cigarettes and cigars can cause lung damage  Ask your healthcare provider for information if you currently smoke and need help to quit  E-cigarettes or smokeless tobacco still contain nicotine  Talk to your healthcare provider before you use these products  · Limit alcohol  A drink of alcohol is 12 ounces of beer, 5 ounces of wine, or 1½ ounces of liquor  · Lose weight, if needed  Being overweight increases your risk of certain health conditions  These include heart disease, high blood pressure, type 2 diabetes, and certain types of cancer  · Protect your skin  Do not sunbathe or use tanning beds  Use sunscreen with a SPF 15 or higher  Apply sunscreen at least 15 minutes before you go outside  Reapply sunscreen every 2 hours  Wear protective clothing, hats, and sunglasses when you are outside  · Drive safely  Always wear your seatbelt  Make sure everyone in your car wears a seatbelt  A seatbelt can save your life if you are in an accident  Do not use your cell phone when you are driving  This could distract you and cause an accident  Pull over if you need to make a call or send a text message  · Practice safe sex  Use latex condoms if are sexually active and have more than one partner  Your healthcare provider may recommend screening tests for sexually transmitted infections (STIs)  · Wear helmets, lifejackets, and protective gear  Always wear a helmet when you ride a bike or motorcycle, go skiing, or play sports that could cause a head injury  Wear protective equipment when you play sports  Wear a lifejacket when you are on a boat or doing water sports      © Copyright [employer] 2022 Information is for End User's use only and may not be sold, redistributed or otherwise used for commercial purposes  All illustrations and images included in CareNotes® are the copyrighted property of [employer]  or Daniela Luis  The above information is an  only  It is not intended as medical advice for individual conditions or treatments  Talk to your doctor, nurse or pharmacist before following any medical regimen to see if it is safe and effective for you  Cholesterol and Your Health   AMBULATORY CARE:   Cholesterol  is a waxy, fat-like substance  Your body uses cholesterol to make hormones and new cells, and to protect nerves  Cholesterol is made by your body  It also comes from certain foods you eat, such as meat and dairy products  Your healthcare provider can help you set goals for your cholesterol levels  He or she can help you create a plan to meet your goals  Cholesterol level goals: Your cholesterol level goals depend on your risk for heart disease, your age, and your other health conditions  The following are general guidelines:  · Total cholesterol  includes low-density lipoprotein (LDL), high-density lipoprotein (HDL), and triglyceride levels  The total cholesterol level should be lower than 200 mg/dL and is best at about 150 mg/dL  · LDL cholesterol  is called bad cholesterol  because it forms plaque in your arteries  As plaque builds up, your arteries become narrow, and less blood flows through  When plaque decreases blood flow to your heart, you may have chest pain  If plaque completely blocks an artery that brings blood to your heart, you may have a heart attack  Plaque can break off and form blood clots  Blood clots may block arteries in your brain and cause a stroke  The level should be less than 130 mg/dL and is best at about 100 mg/dL  · HDL cholesterol  is called good cholesterol  because it helps remove LDL cholesterol from your arteries   It does this by attaching to LDL cholesterol and carrying it to your liver  Your liver breaks down LDL cholesterol so your body can get rid of it  High levels of HDL cholesterol can help prevent a heart attack and stroke  Low levels of HDL cholesterol can increase your risk for heart disease, heart attack, and stroke  The level should be 60 mg/dL or higher  · Triglycerides  are a type of fat that store energy from foods you eat  High levels of triglycerides also cause plaque buildup  This can increase your risk for a heart attack or stroke  If your triglyceride level is high, your LDL cholesterol level may also be high  The level should be less than 150 mg/dL  Any of the following can increase your risk for high cholesterol:   · Smoking cigarettes    · Being overweight or obese, or not getting enough exercise    · Drinking large amounts of alcohol    · A medical condition such as hypertension (high blood pressure) or diabetes    · Certain genes passed from your parents to you    · Age older than 65 years    What you need to know about having your cholesterol levels checked: Adults 21to 39years of age should have their cholesterol levels checked every 4 to 6 years  Adults 45 years or older should have their cholesterol checked every 1 to 2 years  You may need your cholesterol checked more often, or at a younger age, if you have risk factors for heart disease  You may also need to have your cholesterol checked more often if you have other health conditions, such as diabetes  Blood tests are used to check cholesterol levels  Blood tests measure your levels of triglycerides, LDL cholesterol, and HDL cholesterol  How healthy fats affect your cholesterol levels:  Healthy fats, also called unsaturated fats, help lower LDL cholesterol and triglyceride levels  Healthy fats include the following:  · Monounsaturated fats  are found in foods such as olive oil, canola oil, avocado, nuts, and olives      · Polyunsaturated fats,  such as omega 3 fats, are found in fish, such as salmon, trout, and tuna  They can also be found in plant foods such as flaxseed, walnuts, and soybeans  How unhealthy fats affect your cholesterol levels:  Unhealthy fats increase LDL cholesterol and triglyceride levels  They are found in foods high in cholesterol, saturated fat, and trans fat:  · Cholesterol  is found in eggs, dairy, and meat  · Saturated fat  is found in butter, cheese, ice cream, whole milk, and coconut oil  Saturated fat is also found in meat, such as sausage, hot dogs, and bologna  · Trans fat  is found in liquid oils and is used in fried and baked foods  Foods that contain trans fats include chips, crackers, muffins, sweet rolls, microwave popcorn, and cookies  Treatment  for high cholesterol will also decrease your risk of heart disease, heart attack, and stroke  Treatment may include any of the following:  · Lifestyle changes  may include food, exercise, weight loss, and quitting smoking  You may also need to decrease the amount of alcohol you drink  Your healthcare provider will want you to start with lifestyle changes  Other treatment may be added if lifestyle changes are not enough  Your healthcare provider may recommend you work with a team to manage hyperlipidemia  The team may include medical experts such as a dietitian, an exercise or physical therapist, and a behavior therapist  Your family members may be included in helping you create lifestyle changes  · Medicines  may be given to lower your LDL cholesterol, triglyceride levels, or total cholesterol level  You may need medicines to lower your cholesterol if any of the following is true:    ? You have a history of stroke, TIA, unstable angina, or a heart attack  ? Your LDL cholesterol level is 190 mg/dL or higher  ? You are age 36 to 76 years, have diabetes or heart disease risk factors, and your LDL cholesterol is 70 mg/dL or higher      · Supplements  include fish oil, red yeast rice, and garlic  Fish oil may help lower your triglyceride and LDL cholesterol levels  It may also increase your HDL cholesterol level  Red yeast rice may help decrease your total cholesterol level and LDL cholesterol level  Garlic may help lower your total cholesterol level  Do not take any supplements without talking to your healthcare provider  Food changes you can make to lower your cholesterol levels:  A dietitian can help you create a healthy eating plan  He or she can show you how to read food labels and choose foods low in saturated fat, trans fats, and cholesterol  · Decrease the total amount of fat you eat  Choose lean meats, fat-free or 1% fat milk, and low-fat dairy products, such as yogurt and cheese  Try to limit or avoid red meats  Limit or do not eat fried foods or baked goods, such as cookies  · Replace unhealthy fats with healthy fats  Cook foods in olive oil or canola oil  Choose soft margarines that are low in saturated fat and trans fat  Seeds, nuts, and avocados are other examples of healthy fats  · Eat foods with omega-3 fats  Examples include salmon, tuna, mackerel, walnuts, and flaxseed  Eat fish 2 times per week  Pregnant women should not eat fish that have high levels of mercury, such as shark, swordfish, and zofia mackerel  · Increase the amount of high-fiber foods you eat  High-fiber foods can help lower your LDL cholesterol  Aim to get between 20 and 30 grams of fiber each day  Fruits and vegetables are high in fiber  Eat at least 5 servings each day  Other high-fiber foods are whole-grain or whole-wheat breads, pastas, or cereals, and brown rice  Eat 3 ounces of whole-grain foods each day  Increase fiber slowly  You may have abdominal discomfort, bloating, and gas if you add fiber to your diet too quickly  · Eat healthy protein foods  Examples include low-fat dairy products, skinless chicken and turkey, fish, and nuts      · Limit foods and drinks that are high in sugar  Your dietitian or healthcare provider can help you create daily limits for high-sugar foods and drinks  The limit may be lower if you have diabetes or another health condition  Limits can also help you lose weight if needed  Lifestyle changes you can make to lower your cholesterol levels:   · Maintain a healthy weight  Ask your healthcare provider what a healthy weight is for you  Ask him or her to help you create a weight loss plan if needed  Weight loss can decrease your total cholesterol and triglyceride levels  Weight loss may also help keep your blood pressure at a healthy level  · Be physically active throughout the day  Physical activity, such as exercise, can help lower your total cholesterol level and maintain a healthy weight  Physical activity can also help increase your HDL cholesterol level  Work with your healthcare provider to create an program that is right for you  Get at least 30 to 40 minutes of moderate physical activity most days of the week  Examples of exercise include brisk walking, swimming, or biking  Also include strength training at least 2 times each week  Your healthcare providers can help you create a physical activity plan  · Do not smoke  Nicotine and other chemicals in cigarettes and cigars can raise your cholesterol levels  Ask your healthcare provider for information if you currently smoke and need help to quit  E-cigarettes or smokeless tobacco still contain nicotine  Talk to your healthcare provider before you use these products  · Limit or do not drink alcohol  Alcohol can increase your triglyceride levels  Ask your healthcare provider before you drink alcohol  Ask how much is okay for you to drink in 24 hours or 1 week  Follow up with your doctor as directed:  Write down your questions so you remember to ask them during your visits    © Copyright [employer] 2022 Information is for End User's use only and may not be sold, redistributed or otherwise used for commercial purposes  All illustrations and images included in CareNotes® are the copyrighted property of [employer]  or Danilea Luis  The above information is an  only  It is not intended as medical advice for individual conditions or treatments  Talk to your doctor, nurse or pharmacist before following any medical regimen to see if it is safe and effective for you

## 2022-06-10 LAB
T3 SERPL-MCNC: 1.3 NG/ML (ref 0.6–1.8)
T4 FREE SERPL-MCNC: 1.2 NG/DL (ref 0.76–1.46)

## 2022-07-01 DIAGNOSIS — E03.9 HYPOTHYROIDISM (ACQUIRED): ICD-10-CM

## 2022-07-01 RX ORDER — LEVOTHYROXINE SODIUM 0.1 MG/1
100 TABLET ORAL DAILY
Qty: 90 TABLET | Refills: 1 | Status: SHIPPED | OUTPATIENT
Start: 2022-07-01 | End: 2022-09-06

## 2022-07-05 ENCOUNTER — OFFICE VISIT (OUTPATIENT)
Dept: FAMILY MEDICINE CLINIC | Facility: CLINIC | Age: 32
End: 2022-07-05
Payer: COMMERCIAL

## 2022-07-05 VITALS
HEIGHT: 63 IN | HEART RATE: 88 BPM | TEMPERATURE: 98.2 F | BODY MASS INDEX: 32.43 KG/M2 | DIASTOLIC BLOOD PRESSURE: 72 MMHG | SYSTOLIC BLOOD PRESSURE: 118 MMHG | OXYGEN SATURATION: 99 % | WEIGHT: 183 LBS

## 2022-07-05 DIAGNOSIS — Z12.4 CERVICAL CANCER SCREENING: ICD-10-CM

## 2022-07-05 DIAGNOSIS — N92.6 ABNORMAL MENSES: ICD-10-CM

## 2022-07-05 DIAGNOSIS — R21 RASH AND NONSPECIFIC SKIN ERUPTION: Primary | ICD-10-CM

## 2022-07-05 DIAGNOSIS — M65.9 TENOSYNOVITIS OF THUMB: ICD-10-CM

## 2022-07-05 LAB — SL AMB POCT URINE HCG: NEGATIVE

## 2022-07-05 PROCEDURE — 81025 URINE PREGNANCY TEST: CPT | Performed by: FAMILY MEDICINE

## 2022-07-05 PROCEDURE — 99214 OFFICE O/P EST MOD 30 MIN: CPT | Performed by: FAMILY MEDICINE

## 2022-07-05 RX ORDER — TRIAMCINOLONE ACETONIDE 5 MG/G
CREAM TOPICAL 3 TIMES DAILY
Qty: 30 G | Refills: 0 | Status: SHIPPED | OUTPATIENT
Start: 2022-07-05

## 2022-07-05 NOTE — PROGRESS NOTES
Assessment/Plan:    No problem-specific Assessment & Plan notes found for this encounter  Diagnoses and all orders for this visit:    Rash and nonspecific skin eruption  -     triamcinolone (KENALOG) 0 5 % cream; Apply topically 3 (three) times a day    Tenosynovitis of thumb  -     Diclofenac Sodium (VOLTAREN) 1 %; Apply 2 g topically 4 (four) times a day    Abnormal menses  Cervical cancer screening  Urine preg negative in office  Patient would like OB/GYN evaluation for hormone levels as she feels 'things are off', also overdue for pap smear  TSH, T3 and T4 WNL about 3 weeks ago  She does not see endocrinology  -     POCT urine HCG  -     Ambulatory Referral to Obstetrics / Gynecology; Future        Subjective:      Patient ID: Tiffany Verma is a 27 y o  female  HPI  Patient presents to to the office for evaluation  Notes that she has had a few itchy bumps on her hands and feels like this is spreading  States that her  does not have any bites or rashes  Has not used anything on the rash  She used an ice pack and that helped with the itch  Works in [address]  Notes that her right wrist/thumb has been bothersome  States that she has been weed whacking a lot  Notes that it hurts after she is using the weed whacker  LMP: 5/16/22 (one day of bleeding on 7/3/22)  Urine preg at home was negative on 6/19/22  Menses have been irregular her whole life but reports that this feel different now  States that she has never had her menses for one day  She is concerned that something is "off hormonally"  Denies hot flashes  Notes dizziness which darcy been present her whole life, comes and goes  Does not identify any triggers  States that she keeps herself well hydrated while working, especially in the heat        The following portions of the patient's history were reviewed and updated as appropriate: allergies, current medications, past family history, past medical history, past social history, past surgical history and problem list     Review of Systems      Objective:  /72   Pulse 88   Temp 98 2 °F (36 8 °C)   Ht 5' 3" (1 6 m)   Wt 83 kg (183 lb)   SpO2 99%   BMI 32 42 kg/m²      Physical Exam  Vitals reviewed  Constitutional:       General: She is not in acute distress  Appearance: Normal appearance  HENT:      Head: Normocephalic and atraumatic  Right Ear: External ear normal       Left Ear: External ear normal       Nose: Nose normal       Mouth/Throat:      Mouth: Mucous membranes are moist    Eyes:      Extraocular Movements: Extraocular movements intact  Conjunctiva/sclera: Conjunctivae normal    Pulmonary:      Effort: Pulmonary effort is normal       Breath sounds: No wheezing, rhonchi or rales  Abdominal:      General: Bowel sounds are normal       Palpations: Abdomen is soft  Musculoskeletal:      Right wrist: No snuff box tenderness  Left wrist: Normal       Comments: Finkelstein's positive on the right  Skin:     General: Skin is warm  Capillary Refill: Capillary refill takes less than 2 seconds  Findings: Rash (mildly erythemaous papular rash on right wrist and a few fingers of the left hand  small fluid filled vesicles on a flesh colored base  some linear pattern  ) present  Neurological:      Mental Status: She is alert  Mental status is at baseline           Kristal Ozuna DO  [facility]  7/5/2022 10:32 AM

## 2022-09-06 ENCOUNTER — APPOINTMENT (OUTPATIENT)
Dept: LAB | Facility: HOSPITAL | Age: 32
End: 2022-09-06
Payer: COMMERCIAL

## 2022-09-06 ENCOUNTER — OFFICE VISIT (OUTPATIENT)
Dept: FAMILY MEDICINE CLINIC | Facility: CLINIC | Age: 32
End: 2022-09-06
Payer: COMMERCIAL

## 2022-09-06 VITALS
HEART RATE: 82 BPM | TEMPERATURE: 97.2 F | SYSTOLIC BLOOD PRESSURE: 122 MMHG | DIASTOLIC BLOOD PRESSURE: 80 MMHG | OXYGEN SATURATION: 100 % | BODY MASS INDEX: 32.6 KG/M2 | WEIGHT: 184 LBS | HEIGHT: 63 IN

## 2022-09-06 DIAGNOSIS — E03.8 HYPOTHYROIDISM DUE TO HASHIMOTO'S THYROIDITIS: ICD-10-CM

## 2022-09-06 DIAGNOSIS — R53.83 FATIGUE, UNSPECIFIED TYPE: ICD-10-CM

## 2022-09-06 DIAGNOSIS — R53.83 FATIGUE, UNSPECIFIED TYPE: Primary | ICD-10-CM

## 2022-09-06 DIAGNOSIS — E06.3 HYPOTHYROIDISM DUE TO HASHIMOTO'S THYROIDITIS: ICD-10-CM

## 2022-09-06 LAB
ALBUMIN SERPL BCP-MCNC: 4.3 G/DL (ref 3.5–5)
ALP SERPL-CCNC: 92 U/L (ref 46–116)
ALT SERPL W P-5'-P-CCNC: 15 U/L (ref 12–78)
ANION GAP SERPL CALCULATED.3IONS-SCNC: 11 MMOL/L (ref 4–13)
AST SERPL W P-5'-P-CCNC: 18 U/L (ref 5–45)
BACTERIA UR QL AUTO: NORMAL /HPF
BASOPHILS # BLD AUTO: 0.06 THOUSANDS/ΜL (ref 0–0.1)
BASOPHILS NFR BLD AUTO: 1 % (ref 0–1)
BILIRUB SERPL-MCNC: 0.8 MG/DL (ref 0.2–1)
BILIRUB UR QL STRIP: NEGATIVE
BUN SERPL-MCNC: 12 MG/DL (ref 5–25)
CALCIUM SERPL-MCNC: 9.8 MG/DL (ref 8.3–10.1)
CHLORIDE SERPL-SCNC: 99 MMOL/L (ref 96–108)
CLARITY UR: CLEAR
CO2 SERPL-SCNC: 29 MMOL/L (ref 21–32)
COLOR UR: ABNORMAL
CREAT SERPL-MCNC: 0.79 MG/DL (ref 0.6–1.3)
CREAT UR-MCNC: 29.6 MG/DL
CRP SERPL QL: 10.1 MG/L
EOSINOPHIL # BLD AUTO: 0.11 THOUSAND/ΜL (ref 0–0.61)
EOSINOPHIL NFR BLD AUTO: 1 % (ref 0–6)
ERYTHROCYTE [DISTWIDTH] IN BLOOD BY AUTOMATED COUNT: 11.9 % (ref 11.6–15.1)
EST. AVERAGE GLUCOSE BLD GHB EST-MCNC: 100 MG/DL
GFR SERPL CREATININE-BSD FRML MDRD: 99 ML/MIN/1.73SQ M
GLUCOSE SERPL-MCNC: 86 MG/DL (ref 65–140)
GLUCOSE UR STRIP-MCNC: NEGATIVE MG/DL
HBA1C MFR BLD: 5.1 %
HCT VFR BLD AUTO: 39.9 % (ref 34.8–46.1)
HGB BLD-MCNC: 13.5 G/DL (ref 11.5–15.4)
HGB UR QL STRIP.AUTO: NEGATIVE
IMM GRANULOCYTES # BLD AUTO: 0.03 THOUSAND/UL (ref 0–0.2)
IMM GRANULOCYTES NFR BLD AUTO: 0 % (ref 0–2)
KETONES UR STRIP-MCNC: NEGATIVE MG/DL
LEUKOCYTE ESTERASE UR QL STRIP: ABNORMAL
LYMPHOCYTES # BLD AUTO: 1.95 THOUSANDS/ΜL (ref 0.6–4.47)
LYMPHOCYTES NFR BLD AUTO: 19 % (ref 14–44)
MCH RBC QN AUTO: 30.6 PG (ref 26.8–34.3)
MCHC RBC AUTO-ENTMCNC: 33.8 G/DL (ref 31.4–37.4)
MCV RBC AUTO: 91 FL (ref 82–98)
MICROALBUMIN UR-MCNC: 5.5 MG/L (ref 0–20)
MICROALBUMIN/CREAT 24H UR: 19 MG/G CREATININE (ref 0–30)
MONOCYTES # BLD AUTO: 0.57 THOUSAND/ΜL (ref 0.17–1.22)
MONOCYTES NFR BLD AUTO: 6 % (ref 4–12)
NEUTROPHILS # BLD AUTO: 7.7 THOUSANDS/ΜL (ref 1.85–7.62)
NEUTS SEG NFR BLD AUTO: 73 % (ref 43–75)
NITRITE UR QL STRIP: NEGATIVE
NON-SQ EPI CELLS URNS QL MICRO: NORMAL /HPF
NRBC BLD AUTO-RTO: 0 /100 WBCS
OTHER STN SPEC: NORMAL
PH UR STRIP.AUTO: 6 [PH]
PLATELET # BLD AUTO: 459 THOUSANDS/UL (ref 149–390)
PMV BLD AUTO: 9.5 FL (ref 8.9–12.7)
POTASSIUM SERPL-SCNC: 3.8 MMOL/L (ref 3.5–5.3)
PROT SERPL-MCNC: 8.6 G/DL (ref 6.4–8.4)
PROT UR STRIP-MCNC: NEGATIVE MG/DL
RBC # BLD AUTO: 4.41 MILLION/UL (ref 3.81–5.12)
RBC #/AREA URNS AUTO: NORMAL /HPF
SODIUM SERPL-SCNC: 139 MMOL/L (ref 135–147)
SP GR UR STRIP.AUTO: 1.01 (ref 1–1.03)
T4 FREE SERPL-MCNC: 1.28 NG/DL (ref 0.76–1.46)
TSH SERPL DL<=0.05 MIU/L-ACNC: 3.19 UIU/ML (ref 0.45–4.5)
UROBILINOGEN UR QL STRIP.AUTO: 0.2 E.U./DL
VIT B12 SERPL-MCNC: 512 PG/ML (ref 100–900)
WBC # BLD AUTO: 10.42 THOUSAND/UL (ref 4.31–10.16)
WBC #/AREA URNS AUTO: NORMAL /HPF

## 2022-09-06 PROCEDURE — 82570 ASSAY OF URINE CREATININE: CPT | Performed by: PHYSICIAN ASSISTANT

## 2022-09-06 PROCEDURE — 82043 UR ALBUMIN QUANTITATIVE: CPT | Performed by: PHYSICIAN ASSISTANT

## 2022-09-06 PROCEDURE — 85025 COMPLETE CBC W/AUTO DIFF WBC: CPT

## 2022-09-06 PROCEDURE — 36415 COLL VENOUS BLD VENIPUNCTURE: CPT

## 2022-09-06 PROCEDURE — 84439 ASSAY OF FREE THYROXINE: CPT

## 2022-09-06 PROCEDURE — 82607 VITAMIN B-12: CPT

## 2022-09-06 PROCEDURE — 81001 URINALYSIS AUTO W/SCOPE: CPT | Performed by: PHYSICIAN ASSISTANT

## 2022-09-06 PROCEDURE — 86140 C-REACTIVE PROTEIN: CPT

## 2022-09-06 PROCEDURE — 83036 HEMOGLOBIN GLYCOSYLATED A1C: CPT

## 2022-09-06 PROCEDURE — 84443 ASSAY THYROID STIM HORMONE: CPT

## 2022-09-06 PROCEDURE — 99214 OFFICE O/P EST MOD 30 MIN: CPT | Performed by: PHYSICIAN ASSISTANT

## 2022-09-06 PROCEDURE — 82306 VITAMIN D 25 HYDROXY: CPT

## 2022-09-06 PROCEDURE — 80053 COMPREHEN METABOLIC PANEL: CPT

## 2022-09-06 NOTE — PROGRESS NOTES
Assessment/Plan:          Diagnoses and all orders for this visit:    Fatigue, unspecified type  -     CBC and differential; Future  -     Comprehensive metabolic panel; Future  -     Microalbumin / creatinine urine ratio  -     C-reactive protein; Future  -     Vitamin B12; Future  -     Vitamin D Panel; Future  -     UA (URINE) with reflex to Scope  -     Hemoglobin A1C; Future    Hypothyroidism due to Hashimoto's thyroiditis  -     TSH, 3rd generation; Future  -     T4, free; Future            Subjective:      Patient ID: Annia Banks is a 27 y o  female  Fatigue  This is a recurrent problem  The current episode started 1 to 4 weeks ago  The problem occurs constantly  The problem has been waxing and waning  Associated symptoms include fatigue  Pertinent negatives include no abdominal pain, anorexia, arthralgias, change in bowel habit, chest pain, chills, congestion, coughing, diaphoresis, fever, headaches, joint swelling, myalgias, nausea, neck pain, numbness, rash, sore throat, swollen glands, urinary symptoms, vertigo, visual change, vomiting or weakness  Nothing aggravates the symptoms  She has tried nothing for the symptoms  The treatment provided no relief  The following portions of the patient's history were reviewed and updated as appropriate:   She has no past medical history on file  ,  does not have any pertinent problems on file  ,   has no past surgical history on file  ,  family history is not on file  ,   reports that she has never smoked  She has never used smokeless tobacco  No history on file for alcohol use and drug use ,  is allergic to other and penicillins     Current Outpatient Medications   Medication Sig Dispense Refill    levothyroxine 100 mcg tablet Take 1 tablet (100 mcg total) by mouth daily 90 tablet 1    triamcinolone (KENALOG) 0 5 % cream Apply topically 3 (three) times a day 30 g 0    Diclofenac Sodium (VOLTAREN) 1 % Apply 2 g topically 4 (four) times a day (Patient not taking: Reported on 9/6/2022) 100 g 1     No current facility-administered medications for this visit  Review of Systems   Constitutional: Positive for fatigue  Negative for chills, diaphoresis and fever  HENT: Negative for congestion, ear pain, postnasal drip, sore throat and trouble swallowing  Eyes: Negative for pain and visual disturbance  Respiratory: Negative for cough, chest tightness and shortness of breath  Cardiovascular: Negative for chest pain  Gastrointestinal: Negative for abdominal pain, anorexia, change in bowel habit, constipation, diarrhea, nausea and vomiting  Endocrine: Negative for polydipsia, polyphagia and polyuria  Genitourinary: Negative for difficulty urinating, dysuria and flank pain  Musculoskeletal: Negative for arthralgias, joint swelling, myalgias and neck pain  Skin: Negative for rash  Neurological: Negative for dizziness, vertigo, speech difficulty, weakness, light-headedness, numbness and headaches  Psychiatric/Behavioral: Negative  Objective:  Vitals:    09/06/22 1507   BP: 122/80   BP Location: Left arm   Patient Position: Sitting   Cuff Size: Adult   Pulse: 82   Temp: (!) 97 2 °F (36 2 °C)   TempSrc: Tympanic   SpO2: 100%   Weight: 83 5 kg (184 lb)   Height: 5' 3" (1 6 m)     Body mass index is 32 59 kg/m²  Physical Exam  Vitals and nursing note reviewed  Constitutional:       Appearance: Normal appearance  She is well-developed  HENT:      Head: Normocephalic and atraumatic  Right Ear: External ear normal       Left Ear: External ear normal    Eyes:      Extraocular Movements: Extraocular movements intact  Conjunctiva/sclera: Conjunctivae normal       Pupils: Pupils are equal, round, and reactive to light  Neck:      Thyroid: No thyromegaly  Cardiovascular:      Rate and Rhythm: Normal rate and regular rhythm  Pulses: Normal pulses  Heart sounds: Normal heart sounds     Pulmonary:      Effort: Pulmonary effort is normal       Breath sounds: Normal breath sounds  Abdominal:      General: Bowel sounds are normal       Palpations: Abdomen is soft  There is no mass  Tenderness: There is no abdominal tenderness  There is no right CVA tenderness or left CVA tenderness  Musculoskeletal:         General: Normal range of motion  Cervical back: Normal range of motion  No tenderness  Right lower leg: No edema  Left lower leg: No edema  Lymphadenopathy:      Cervical: No cervical adenopathy  Skin:     General: Skin is dry  Neurological:      Mental Status: She is alert and oriented to person, place, and time  Deep Tendon Reflexes: Reflexes are normal and symmetric  Psychiatric:         Mood and Affect: Mood normal          Behavior: Behavior normal          Thought Content:  Thought content normal          Judgment: Judgment normal

## 2022-09-07 DIAGNOSIS — R53.83 FATIGUE, UNSPECIFIED TYPE: Primary | ICD-10-CM

## 2022-09-07 DIAGNOSIS — D69.6 THROMBOCYTOPENIA (HCC): ICD-10-CM

## 2022-09-07 DIAGNOSIS — D72.9 NEUTROPHILIC LEUKOCYTOSIS: ICD-10-CM

## 2022-09-11 LAB
25(OH)D2 SERPL-MCNC: <1 NG/ML
25(OH)D3 SERPL-MCNC: 32 NG/ML
25(OH)D3+25(OH)D2 SERPL-MCNC: 33 NG/ML

## 2022-09-12 DIAGNOSIS — D72.9 NEUTROPHILIC LEUKOCYTOSIS: Primary | ICD-10-CM

## 2022-09-19 ENCOUNTER — TELEPHONE (OUTPATIENT)
Dept: FAMILY MEDICINE CLINIC | Facility: CLINIC | Age: 32
End: 2022-09-19

## 2022-09-19 NOTE — TELEPHONE ENCOUNTER
T/c from pt - pt is confused as to whether or not she needs to repeat blood work in 2 weeks? States she received a call that she did need to repeat and then another stating labs are normal     Please advise

## 2022-10-07 ENCOUNTER — OFFICE VISIT (OUTPATIENT)
Dept: FAMILY MEDICINE CLINIC | Facility: CLINIC | Age: 32
End: 2022-10-07
Payer: COMMERCIAL

## 2022-10-07 VITALS
HEART RATE: 98 BPM | HEIGHT: 63 IN | SYSTOLIC BLOOD PRESSURE: 116 MMHG | BODY MASS INDEX: 32.07 KG/M2 | OXYGEN SATURATION: 97 % | WEIGHT: 181 LBS | TEMPERATURE: 97.8 F | DIASTOLIC BLOOD PRESSURE: 74 MMHG

## 2022-10-07 DIAGNOSIS — T30.0 BURN: Primary | ICD-10-CM

## 2022-10-07 PROCEDURE — 99213 OFFICE O/P EST LOW 20 MIN: CPT | Performed by: PHYSICIAN ASSISTANT
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
## 2023-01-31 ENCOUNTER — OFFICE VISIT (OUTPATIENT)
Dept: FAMILY MEDICINE CLINIC | Facility: CLINIC | Age: 33
End: 2023-01-31

## 2023-01-31 VITALS
BODY MASS INDEX: 32.07 KG/M2 | OXYGEN SATURATION: 98 % | WEIGHT: 181 LBS | TEMPERATURE: 97.1 F | SYSTOLIC BLOOD PRESSURE: 126 MMHG | DIASTOLIC BLOOD PRESSURE: 80 MMHG | HEIGHT: 63 IN | HEART RATE: 101 BPM

## 2023-01-31 DIAGNOSIS — J02.9 PHARYNGITIS, UNSPECIFIED ETIOLOGY: ICD-10-CM

## 2023-01-31 DIAGNOSIS — E03.8 HYPOTHYROIDISM DUE TO HASHIMOTO'S THYROIDITIS: Primary | ICD-10-CM

## 2023-01-31 DIAGNOSIS — E06.3 HYPOTHYROIDISM DUE TO HASHIMOTO'S THYROIDITIS: Primary | ICD-10-CM

## 2023-01-31 NOTE — PROGRESS NOTES
Assessment/Plan:  BMI Counseling: Body mass index is 32 06 kg/m²  The BMI is above normal  Nutrition recommendations include encouraging healthy choices of fruits and vegetables, consuming healthier snacks, limiting drinks that contain sugar, moderation in carbohydrate intake, increasing intake of lean protein, reducing intake of saturated and trans fat and reducing intake of cholesterol  Exercise recommendations include moderate physical activity 150 minutes/week  No pharmacotherapy was ordered  Rationale for BMI follow-up plan is due to patient being overweight or obese  Depression Screening and Follow-up Plan: Patient was screened for depression during today's encounter  They screened negative with a PHQ-2 score of 0  Diagnoses and all orders for this visit:    Hypothyroidism due to Hashimoto's thyroiditis  -     TSH, 3rd generation; Future  -     T4, free; Future    Pharyngitis, unspecified etiology            Subjective:      Patient ID: Surjit Moreno is a 34 y o  female  Thyroid Problem  Presents for follow-up visit  Symptoms include depressed mood and fatigue  Patient reports no anxiety, cold intolerance, constipation, diaphoresis, diarrhea, dry skin, hair loss, heat intolerance, hoarse voice, leg swelling, menstrual problem, nail problem, palpitations, tremors, visual change, weight gain or weight loss  The symptoms have been worsening  Sore Throat   This is a new problem  The current episode started in the past 7 days  The problem has been unchanged  There has been no fever  The pain is mild  Pertinent negatives include no abdominal pain, congestion, coughing, diarrhea, ear discharge, ear pain, headaches, hoarse voice, plugged ear sensation, neck pain, shortness of breath, swollen glands, trouble swallowing or vomiting  She has had exposure to strep  She has tried nothing for the symptoms         The following portions of the patient's history were reviewed and updated as appropriate: She has no past medical history on file  ,  does not have any pertinent problems on file  ,   has no past surgical history on file  ,  family history is not on file  ,   reports that she has never smoked  She has never used smokeless tobacco  No history on file for alcohol use and drug use ,  is allergic to other and penicillins     Current Outpatient Medications   Medication Sig Dispense Refill   • levothyroxine 100 mcg tablet Take 1 tablet (100 mcg total) by mouth daily 90 tablet 1     No current facility-administered medications for this visit  Review of Systems   Constitutional: Positive for fatigue  Negative for diaphoresis, weight gain and weight loss  HENT: Positive for sore throat  Negative for congestion, ear discharge, ear pain, hoarse voice, postnasal drip and trouble swallowing  Eyes: Negative for pain and visual disturbance  Respiratory: Negative for cough and shortness of breath  Cardiovascular: Negative for palpitations  Gastrointestinal: Negative for abdominal pain, constipation, diarrhea and vomiting  Endocrine: Negative for cold intolerance and heat intolerance  Genitourinary: Negative for menstrual problem  Musculoskeletal: Negative for neck pain  Neurological: Negative for dizziness, tremors, light-headedness and headaches  Psychiatric/Behavioral: The patient is not nervous/anxious  Objective:  Vitals:    01/31/23 1342   BP: 126/80   BP Location: Left arm   Patient Position: Sitting   Cuff Size: Adult   Pulse: 101   Temp: (!) 97 1 °F (36 2 °C)   TempSrc: Tympanic   SpO2: 98%   Weight: 82 1 kg (181 lb)   Height: 5' 3" (1 6 m)     Body mass index is 32 06 kg/m²  Physical Exam  Vitals and nursing note reviewed  Constitutional:       Appearance: Normal appearance  HENT:      Head: Normocephalic        Right Ear: Tympanic membrane, ear canal and external ear normal       Left Ear: Tympanic membrane, ear canal and external ear normal       Nose: Nose normal  Mouth/Throat:      Mouth: Mucous membranes are moist       Pharynx: Oropharynx is clear  No oropharyngeal exudate or posterior oropharyngeal erythema  Eyes:      Conjunctiva/sclera: Conjunctivae normal    Neck:      Thyroid: No thyroid mass, thyromegaly or thyroid tenderness  Cardiovascular:      Rate and Rhythm: Normal rate and regular rhythm  Heart sounds: Normal heart sounds  Pulmonary:      Effort: Pulmonary effort is normal       Breath sounds: Normal breath sounds  Musculoskeletal:         General: Normal range of motion  Cervical back: Normal range of motion  No tenderness  Right lower leg: No edema  Left lower leg: No edema  Lymphadenopathy:      Cervical: No cervical adenopathy  Skin:     General: Skin is warm and dry  Findings: Lesion and rash present  Rash is pustular  Neurological:      Mental Status: She is alert and oriented to person, place, and time     Psychiatric:         Mood and Affect: Mood normal          Behavior: Behavior normal

## 2023-01-31 NOTE — PATIENT INSTRUCTIONS
Pharyngitis   AMBULATORY CARE:   Pharyngitis , or sore throat, is inflammation of the tissues and structures in your pharynx (throat)  Pharyngitis is most often caused by bacteria  It may also be caused by a cold or flu virus  Other causes include smoking, allergies, or acid reflux  Signs and symptoms that may occur with pharyngitis:   Sore throat or pain when you swallow    Fever, chills, and body aches    Hoarse or raspy voice    Cough, runny or stuffy nose, itchy or watery eyes    Headache    Upset stomach and loss of appetite    Mild neck stiffness    Swollen glands that feel like hard lumps when you touch your neck    White and yellow pus-filled blisters in the back of your throat    Call 911 for any of the following: You have trouble breathing or swallowing because your throat is swollen or sore  Seek care immediately if:   You are drooling because it hurts too much to swallow  Your fever is higher than 102? F (39?C) or lasts longer than 3 days  You are confused  You taste blood in your throat  Contact your healthcare provider if:   Your throat pain gets worse  You have a painful lump in your throat that does not go away after 5 days  Your symptoms do not improve after 5 days  You have questions or concerns about your condition or care  Treatment for pharyngitis:  Viral pharyngitis will go away on its own without treatment  Your sore throat should start to feel better in 3 to 5 days for both viral and bacterial infections  You may need any of the following:  Antibiotics  treat a bacterial infection  NSAIDs , such as ibuprofen, help decrease swelling, pain, and fever  NSAIDs can cause stomach bleeding or kidney problems in certain people  If you take blood thinner medicine, always ask your healthcare provider if NSAIDs are safe for you  Always read the medicine label and follow directions  Acetaminophen  decreases pain and fever  It is available without a doctor's order  Ask how much to take and how often to take it  Follow directions  Acetaminophen can cause liver damage if not taken correctly  Manage your symptoms:   Gargle salt water  Mix ¼ teaspoon salt in an 8 ounce glass of warm water and gargle  This may help decrease swelling in your throat  Drink liquids as directed  You may need to drink more liquids than usual  Liquids may help soothe your throat and prevent dehydration  Ask how much liquid to drink each day and which liquids are best for you  Use a cool-steam humidifier  to help moisten the air in your room and calm your cough  Soothe your throat  with cough drops, ice, soft foods, or popsicles  Prevent the spread of pharyngitis:  Cover your mouth and nose when you cough or sneeze  Do not share food or drinks  Wash your hands often  Use soap and water  If soap and water are unavailable, use an alcohol based hand   Follow up with your doctor as directed:  Write down your questions so you remember to ask them during your visits  © Copyright [employer] 2022 Information is for End User's use only and may not be sold, redistributed or otherwise used for commercial purposes  All illustrations and images included in CareNotes® are the copyrighted property of A [employer] Inc  or [facility] [employer]ron   The above information is an  only  It is not intended as medical advice for individual conditions or treatments  Talk to your doctor, nurse or pharmacist before following any medical regimen to see if it is safe and effective for you

## 2023-02-01 ENCOUNTER — APPOINTMENT (OUTPATIENT)
Dept: LAB | Facility: HOSPITAL | Age: 33
End: 2023-02-01

## 2023-02-01 DIAGNOSIS — E06.3 HYPOTHYROIDISM DUE TO HASHIMOTO'S THYROIDITIS: ICD-10-CM

## 2023-02-01 DIAGNOSIS — E03.8 HYPOTHYROIDISM DUE TO HASHIMOTO'S THYROIDITIS: ICD-10-CM

## 2023-02-01 LAB
T4 FREE SERPL-MCNC: 0.53 NG/DL (ref 0.76–1.46)
TSH SERPL DL<=0.05 MIU/L-ACNC: 235 UIU/ML (ref 0.45–4.5)

## 2023-02-02 ENCOUNTER — TELEPHONE (OUTPATIENT)
Dept: FAMILY MEDICINE CLINIC | Facility: CLINIC | Age: 33
End: 2023-02-02

## 2023-02-02 DIAGNOSIS — E03.9 HYPOTHYROIDISM (ACQUIRED): ICD-10-CM

## 2023-02-02 RX ORDER — LEVOTHYROXINE SODIUM 0.1 MG/1
100 TABLET ORAL DAILY
Qty: 90 TABLET | Refills: 1 | Status: SHIPPED | OUTPATIENT
Start: 2023-02-02 | End: 2023-05-22 | Stop reason: SDUPTHER

## 2023-02-02 RX ORDER — LEVOTHYROXINE SODIUM 0.1 MG/1
100 TABLET ORAL DAILY
Qty: 90 TABLET | Refills: 1 | Status: SHIPPED | OUTPATIENT
Start: 2023-02-02 | End: 2023-02-02 | Stop reason: SDUPTHER

## 2023-02-02 NOTE — TELEPHONE ENCOUNTER
T/c from pt -- [facility] reports they did not receive her updated levothyroxine script today  Can it please be resent

## 2023-05-22 DIAGNOSIS — E03.9 HYPOTHYROIDISM (ACQUIRED): ICD-10-CM

## 2023-05-22 RX ORDER — LEVOTHYROXINE SODIUM 0.1 MG/1
100 TABLET ORAL DAILY
Qty: 90 TABLET | Refills: 1 | Status: SHIPPED | OUTPATIENT
Start: 2023-05-22 | End: 2023-06-21

## 2023-07-24 ENCOUNTER — OFFICE VISIT (OUTPATIENT)
Age: 33
End: 2023-07-24
Payer: COMMERCIAL

## 2023-07-24 VITALS
WEIGHT: 165.2 LBS | HEIGHT: 63 IN | DIASTOLIC BLOOD PRESSURE: 74 MMHG | SYSTOLIC BLOOD PRESSURE: 102 MMHG | BODY MASS INDEX: 29.27 KG/M2

## 2023-07-24 DIAGNOSIS — E06.3 HYPOTHYROIDISM DUE TO HASHIMOTO'S THYROIDITIS: ICD-10-CM

## 2023-07-24 DIAGNOSIS — Z01.419 ENCOUNTER FOR ANNUAL ROUTINE GYNECOLOGICAL EXAMINATION: Primary | ICD-10-CM

## 2023-07-24 DIAGNOSIS — Z12.4 CERVICAL CANCER SCREENING: ICD-10-CM

## 2023-07-24 DIAGNOSIS — E03.8 HYPOTHYROIDISM DUE TO HASHIMOTO'S THYROIDITIS: ICD-10-CM

## 2023-07-24 DIAGNOSIS — Z31.69 ENCOUNTER FOR PRECONCEPTION CONSULTATION: ICD-10-CM

## 2023-07-24 PROCEDURE — G0476 HPV COMBO ASSAY CA SCREEN: HCPCS

## 2023-07-24 PROCEDURE — 99385 PREV VISIT NEW AGE 18-39: CPT

## 2023-07-24 PROCEDURE — G0145 SCR C/V CYTO,THINLAYER,RESCR: HCPCS
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
## 2023-07-24 NOTE — ASSESSMENT & PLAN NOTE
Normal findings on routine gyn exam  Redness noted to perineum and vulva- patient has been taking frequent steam showers. Denies symptoms. Reviewed perineal hygiene. First pap collected today   Declined Gardasil series  STD screening offered. Pt declined - low risk  SBE encouraged. CBE annually. Mammograms >39yo. Advised to call with any issues, all concerns & questions addressed.    See provided information in your after visit summary

## 2023-07-24 NOTE — PATIENT INSTRUCTIONS
Pap Smear   AMBULATORY CARE:   A Pap smear,  or Pap test, is used to screen for cervical cancer. It is also used to find precancerous and cancerous cells of the vulva and vagina. Prepare for a Pap smear: The best time to schedule the test is right after your period stops. Do not have a Pap smear during your monthly period. During a Pap smear:   You will lie on your back and place your feet on footrests called stirrups. Your healthcare provider will gently insert a device called a speculum into your vagina. The speculum is used to open the walls of your vagina so your provider can see your cervix. Your provider will gently take cell samples from your cervix and vagina. The samples are placed in a container with liquid or on a glass slide. They are sent to a lab and examined for abnormal cells. A test for human papillomavirus (HPV) may be done at the same time. HPV is a sexually transmitted virus that can cause changes in cervical cells. After a Pap smear:  You may have some spotting the day of your procedure. Test results: Your healthcare provider will tell you when you can expect your Pap smear results. It usually takes about 1 to 3 weeks. Normal results  mean that no cell changes were found on your cervix. You may be able to wait 3 to 5 years for your next Pap smear exam.    Unclear results  may mean they did not get a good sample of cervical tissue. Or, it may mean there are changes in your cells that look abnormal. This could be due to an infection, pregnancy, menopause, or HPV. You may need another Pap smear in 1 year. Your healthcare provider will tell you what to do next. Abnormal results  mean that cell changes were found on your cervix. This does not mean you have cervical cancer. It could mean you have inflammation or an infection. It could also mean you have HPV or cells that might develop into cancer.  Your healthcare provider will explain the abnormal test result to you and go over next steps with you. He or she may recommend a colposcopy. During this procedure, a small scope with a light is used to look more closely at your cervix and vagina. How often to get a Pap smear:  Pap smears usually start at age 24 and continue until age 72. A Pap smear alone may be done every 3 years. An HPV test alone or with a Pap smear may be done every 5 years, starting at age 27. You may need Pap smears more often or after age 72 if you have any of the following:  Abnormal Pap smear result    Positive HPV test result    A history of cervical cancer, or a high risk for cervical cancer    HIV    A weak immune system    Exposure to diethylstilbestrol (SHIRAZ) medicine when your mother was pregnant with you    Call your doctor if:   You have severe bleeding. It has been 3 weeks and you do not have test results. You have questions or concerns about your condition or care. © Copyright Re[employer] 2022 Information is for End User's use only and may not be sold, redistributed or otherwise used for commercial purposes. The above information is an  only. It is not intended as medical advice for individual conditions or treatments. Talk to your doctor, nurse or pharmacist before following any medical regimen to see if it is safe and effective for you. Planning for Pregnancy   AMBULATORY CARE:   Why you should plan for pregnancy:  You can help get your body ready for a healthy pregnancy. A healthy pregnancy can improve your ability to have a healthy baby. The steps you need to take and the amount of time needed depends on your current health and habits. Work with your healthcare provider to help you plan a healthy pregnancy. What you need to know about nutrition and exercise before pregnancy:   Eat a variety of healthy foods. Healthy foods include fruits, vegetables, whole-grain breads, low-fat dairy foods, beans, lean meats, and fish. Limit foods high in sugar, fat, and sodium.  Limit your intake of fish to 2 servings each week. Choose fish low in mercury such as canned light tuna, shrimp, salmon, cod, or tilapia. Do not  eat fish high in mercury such as swordfish, tilefish, zofia mackerel, and shark. Take 400 micrograms (mcg) of folic acid each day. This will help to prevent birth defects of the brain and spine such as spina bifida. Most women should take folic acid before pregnancy and up to 12 weeks after getting pregnant. Exercise for at least 30 minutes, 5 days a week. Some examples of exercise include walking, biking, dancing, and swimming. Include muscle strengthening activities 2 days each week. Regular exercise provides many health benefits. It helps you manage your weight, and decreases your risk for type 2 diabetes, heart disease, stroke, and high blood pressure. Exercise can also help improve your mood. Ask your healthcare provider about the best exercise plan for you. How weight affects pregnancy:   Obesity  can make it harder for you to get pregnant. It also increases your risk of health problems during pregnancy. Some of these health problems include gestational diabetes, high blood pressure, and infections. It can also increase your baby's risk of health problems such as birth defects. Your baby may also be large and harder to deliver or be born prematurely (early). Your risk of miscarriage is also higher if you are obese. Work with your healthcare provider to reach a healthy weight before you try to get pregnant. Being underweight  can also make it hard for you to get pregnant. It can also increase your risk of having a premature baby and miscarriage. Your baby may be born at a low birth weight. What you need to know about smoking, alcohol, and drugs:   Smoking  increases your risk of a miscarriage and other health problems during pregnancy. Smoking can cause your baby to be born too early or weigh less at birth.  Ask your healthcare provider for information if you need help quitting. Alcohol  passes from your body to your baby through the placenta. It can affect your baby's brain development and cause fetal alcohol syndrome (FAS). FAS is a group of conditions that causes mental, behavior, and growth problems. Talk to your healthcare provider if you abuse alcohol and need help quitting before pregnancy. Drugs , such as marijuana and cocaine, should not be used while you are trying to get pregnant or during pregnancy. They increase your risk of problems during pregnancy and increase the risk of having a baby with health problems. These include birth defects, premature birth, and infant death. What you need to know about medicines and supplements:  Tell your healthcare provider about all the medicines and supplements you take. Certain medicines and supplements should not be used during pregnancy. These include over-the-counter medicines, prescription medicines, vitamins, and herbal supplements. He or she may recommend that you take different medicines that are safer during pregnancy. What you need to know about immunizations:  Tell your healthcare provider about all the immunizations you have had. If you have missed any immunizations, your healthcare provider may recommend that you update your immunizations. These include hepatitis B, influenza, MMR (measles, mumps, rubella), Tdap, and varicella immunizations. You should get 1 dose of the Tdap vaccine with each pregnancy. It is best to get the vaccine at 27 to 36 weeks of pregnancy. Tests you may need before pregnancy:  Your healthcare provider may recommend that you have tests to screen for sexually transmitted infections. These include chlamydia, gonorrhea, herpes, HIV infection, syphilis, and tuberculosis. These infectious diseases should be treated before pregnancy, if needed. What you need to know about toxic substances:  Toxic substances can harm a developing baby.  Examples include cleaning products, paints, solvents, pesticides, and other chemical products. They can increase the risk of having a miscarriage, premature birth, and low-birth weight baby. They also increase the risk of developmental delay and childhood cancer. Avoid exposure to toxic substances and materials at work and home. What you need to know about genetic testing:  Tell your healthcare provider about genetic disorders, developmental delays, or other disabilities. Include your family and your partner's family. Also tell your provider about any problems in past pregnancies. Your provider may recommend that you see a healthcare professional called a genetic counselor. He or she will talk to you about how genes, birth defects, and other medical conditions are passed down. He or she can also tell you about your risk for passing a genetic disease in a future pregnancy. A screening test may include blood tests to check your DNA or your partner's DNA. Genetic tests are not always accurate or complete. Your baby may be born with a genetic disorder that did not show up in the tests. Talk to your healthcare provider about any concerns you have with genetic testing. How you can prepare for pregnancy if you have a medical condition:  Medical conditions such as diabetes, high blood pressure, asthma, seizure disorders, and thyroid disorders should be managed before pregnancy. Mental health conditions, such as depression and anxiety, should also be treated. This will decrease your risk of having health problems during pregnancy. It will also decrease your baby's risk for medical problems. Medicines used to treat certain conditions are not safe to use during pregnancy and may need to be changed before you get pregnant. Ask your healthcare provider if it is safe for you to get pregnant if you have a medical condition. Follow up with your doctor or obstetrician as directed:  Write down your questions so you remember to ask them during your visits.   © Copyright [employer] 2022 Information is for End User's use only and may not be sold, redistributed or otherwise used for commercial purposes. The above information is an  only. It is not intended as medical advice for individual conditions or treatments. Talk to your doctor, nurse or pharmacist before following any medical regimen to see if it is safe and effective for you.

## 2023-07-24 NOTE — PROGRESS NOTES
Patient presents for a routine annual visit  Menarche- Y/O  Last Pap Smear- no record. Collect today  LMP-none  Birth control-none.   Non smoker  Social drinker  Currently sexually active  Maternal uncle with colon cancer      Has been trying to conceive for the last 2 months. Hx irregular periods. Last period was sometime in May. Did not get  or July period. Takes low dose iron supplement for borderline anemia.

## 2023-07-24 NOTE — ASSESSMENT & PLAN NOTE
-advised patient hashimoto's is likely contributing to irregular menstrual cycles. Recommend optimizing TSH and health prior to conceiving.   - advised patient to call office if cycles remain irregular or if experiencing difficulties conceiving once euthyroid is achieved   -We discussed tracking menses in deven, timed intercourse, and how sperm needs to be present in the female genital tract in order for fertilization to occur.    -We discussed healthy habits for preconception as well as pregnancy including a well-rounded diet and moderate exercise.    -Also reviewed importance of prenatal vitamin with folic acid to prevent birth defects.

## 2023-07-25 LAB
HPV HR 12 DNA CVX QL NAA+PROBE: NEGATIVE
HPV16 DNA CVX QL NAA+PROBE: NEGATIVE
HPV18 DNA CVX QL NAA+PROBE: NEGATIVE

## 2023-08-01 LAB
LAB AP GYN PRIMARY INTERPRETATION: NORMAL
Lab: NORMAL

## 2023-08-04 ENCOUNTER — TELEPHONE (OUTPATIENT)
Dept: OBGYN CLINIC | Facility: CLINIC | Age: 33
End: 2023-08-04

## 2023-08-04 NOTE — TELEPHONE ENCOUNTER
----- Message from Jono, [address] [place] [address] sent at 8/2/2023  7:17 AM EDT -----  Pls let patient know her pap and HPV were negative.

## 2023-09-29 ENCOUNTER — APPOINTMENT (OUTPATIENT)
Dept: LAB | Facility: HOSPITAL | Age: 33
End: 2023-09-29
Payer: COMMERCIAL

## 2023-09-29 DIAGNOSIS — E03.9 HYPOTHYROIDISM (ACQUIRED): ICD-10-CM

## 2023-09-29 LAB
T4 FREE SERPL-MCNC: 0.7 NG/DL (ref 0.61–1.12)
TSH SERPL DL<=0.05 MIU/L-ACNC: 68.17 UIU/ML (ref 0.45–4.5)

## 2023-09-29 PROCEDURE — 84439 ASSAY OF FREE THYROXINE: CPT

## 2023-09-29 PROCEDURE — 36415 COLL VENOUS BLD VENIPUNCTURE: CPT

## 2023-09-29 PROCEDURE — 84443 ASSAY THYROID STIM HORMONE: CPT

## 2023-09-29 RX ORDER — LEVOTHYROXINE SODIUM 0.12 MG/1
125 TABLET ORAL DAILY
Qty: 90 TABLET | Refills: 1 | Status: SHIPPED | OUTPATIENT
Start: 2023-09-29 | End: 2023-11-22 | Stop reason: SDUPTHER

## 2023-11-22 ENCOUNTER — TELEPHONE (OUTPATIENT)
Dept: FAMILY MEDICINE CLINIC | Facility: CLINIC | Age: 33
End: 2023-11-22

## 2023-11-22 ENCOUNTER — APPOINTMENT (OUTPATIENT)
Dept: LAB | Facility: HOSPITAL | Age: 33
End: 2023-11-22
Payer: COMMERCIAL

## 2023-11-22 DIAGNOSIS — E03.9 HYPOTHYROIDISM (ACQUIRED): ICD-10-CM

## 2023-11-22 LAB — TSH SERPL DL<=0.05 MIU/L-ACNC: 0.33 UIU/ML (ref 0.45–4.5)

## 2023-11-22 PROCEDURE — 84443 ASSAY THYROID STIM HORMONE: CPT

## 2023-11-22 PROCEDURE — 36415 COLL VENOUS BLD VENIPUNCTURE: CPT

## 2023-11-22 RX ORDER — LEVOTHYROXINE SODIUM 112 UG/1
112 TABLET ORAL DAILY
Qty: 90 TABLET | Refills: 1 | Status: SHIPPED | OUTPATIENT
Start: 2023-11-22 | End: 2023-12-22

## 2024-02-05 ENCOUNTER — APPOINTMENT (OUTPATIENT)
Age: 34
End: 2024-02-05
Payer: COMMERCIAL

## 2024-02-05 DIAGNOSIS — E03.9 HYPOTHYROIDISM (ACQUIRED): ICD-10-CM

## 2024-02-05 LAB — TSH SERPL DL<=0.05 MIU/L-ACNC: 0.46 UIU/ML (ref 0.45–4.5)

## 2024-02-05 PROCEDURE — 84443 ASSAY THYROID STIM HORMONE: CPT

## 2024-02-05 PROCEDURE — 36415 COLL VENOUS BLD VENIPUNCTURE: CPT

## 2024-02-06 ENCOUNTER — TELEMEDICINE (OUTPATIENT)
Age: 34
End: 2024-02-06
Payer: COMMERCIAL

## 2024-02-06 DIAGNOSIS — E06.3 HYPOTHYROIDISM DUE TO HASHIMOTO'S THYROIDITIS: Primary | ICD-10-CM

## 2024-02-06 DIAGNOSIS — E03.9 HYPOTHYROIDISM (ACQUIRED): ICD-10-CM

## 2024-02-06 DIAGNOSIS — E03.8 HYPOTHYROIDISM DUE TO HASHIMOTO'S THYROIDITIS: Primary | ICD-10-CM

## 2024-02-06 PROCEDURE — 99244 OFF/OP CNSLTJ NEW/EST MOD 40: CPT | Performed by: STUDENT IN AN ORGANIZED HEALTH CARE EDUCATION/TRAINING PROGRAM

## 2024-02-06 RX ORDER — LEVOTHYROXINE SODIUM 112 UG/1
112 TABLET ORAL DAILY
Qty: 90 TABLET | Refills: 0 | Status: SHIPPED | OUTPATIENT
Start: 2024-02-06 | End: 2024-05-06

## 2024-02-06 NOTE — ASSESSMENT & PLAN NOTE
Longstanding history of hypothyroidism,  Currently on levothyroxine , she is taking it regularly and properly,  She is clinically euthyroid and her TSH is low normal at 0,455.  As she is planning for pregnancy, TSH should be kept below 2.5 and if she gets pregnant to increase the dose by 30%.  Discussed the pathophysiology, manifestations, differnential diagnosis and management of hypothyroidism.   Reviewed the hypothalamic-ptiuitary-thyroid axis and interpretation and significance of TSH, FT4, FT3 values.  Advised that levotyroxine should be taken on an empty stomach. Should avoid taking medications like iron, calcium, tums, [id number], PPI at the same time that may decrease absorption of T4. Should separate administration by 4hrs.  Return back in 4 months,  Labs in 8 weeks.  She will inform me if she gets pregnant to adjust the dose.

## 2024-02-06 NOTE — PROGRESS NOTES
Virtual Regular Visit    Verification of patient location:    Patient is located at Home in the following state in which I hold an active license [place]      Assessment/Plan:    Problem List Items Addressed This Visit          Endocrine    Hypothyroidism due to Hashimoto's thyroiditis - Primary     Longstanding history of hypothyroidism,  Currently on levothyroxine , she is taking it regularly and properly,  She is clinically euthyroid and her TSH is low normal at 0,455.  As she is planning for pregnancy, TSH should be kept below 2.5 and if she gets pregnant to increase the dose by 30%.  Discussed the pathophysiology, manifestations, differnential diagnosis and management of hypothyroidism.   Reviewed the hypothalamic-ptiuitary-thyroid axis and interpretation and significance of TSH, FT4, FT3 values.  Advised that levotyroxine should be taken on an empty stomach. Should avoid taking medications like iron, calcium, tums, F3ygatxhof, PPI at the same time that may decrease absorption of T4. Should separate administration by 4hrs.  Return back in 4 months,  Labs in 8 weeks.  She will inform me if she gets pregnant to adjust the dose.         Relevant Medications    levothyroxine 112 mcg tablet    Other Relevant Orders    T4, free    TSH, 3rd generation     Other Visit Diagnoses       Hypothyroidism (acquired)        Relevant Medications    levothyroxine 112 mcg tablet                 Reason for visit is   Chief Complaint   Patient presents with    Virtual Regular Visit          Encounter provider Cam Flanagan MD    Provider located at [facility] [address] [place] [address]  [phone]      Recent Visits  No visits were found meeting these conditions.  Showing recent visits within past 7 days and meeting all other requirements  Today's Visits  Date Type Provider Dept   02/06/24 Telemedicine Cam Flanagan MD [facility] [place]   Showing today's visits and meeting all other requirements  Future Appointments  No visits were found meeting these conditions.  Showing future appointments within next 150 days and meeting all other requirements       The patient was identified by name and date of birth. Antonia Berman was informed that this is a telemedicine visit and that the visit is being conducted through the Epic Embedded platform. She agrees to proceed..  My office door was closed. No one else was in the room.  She acknowledged consent and understanding of privacy and security of the video platform. The patient has agreed to participate and understands they can discontinue the visit at any time.    Patient is aware this is a billable service.     Subjective  Antonia Berman is a 33 y.o. female with hypothyroidism  .      HPI   Antonia Berman is a 33 year old female who is referred by OBGYN team for hypothyroidism.    History of hypothyroidism since age 11 or 12, on levothyroxine 112 mcg once daily, she was taking 125 mcg once daily 2 months ago,  She is taking levothyroxine early morning on an empty stomach and she wait at least one hours before she eats or takes other medication,     She denies palpitations, tremors, excessive sweating, heat intolerance, hair loss,  She reports irregular menstruations.         Past Medical History:   Diagnosis Date    Sinus tachycardia        No past surgical history on file.    Current Outpatient Medications   Medication Sig Dispense Refill    Ascorbic Acid (VITAMIN C PO) Take by mouth      Cholecalciferol (VITAMIN D3 PO) Take by mouth      Ferrous Gluconate (IRON 27 PO) Take by mouth      levothyroxine 112 mcg tablet Take 1 tablet (112 mcg total) by mouth daily 90 tablet 1    Multiple Vitamins-Minerals (ZINC PO) Take by mouth       No current facility-administered medications for this visit.        Allergies   Allergen Reactions    Other Anaphylaxis     Green  Peppers    Penicillins Hives       Review of Systems   Constitutional:  Negative for appetite change, fatigue and unexpected weight change.   HENT:  Negative for trouble swallowing and voice change.    Eyes:  Negative for visual disturbance.   Respiratory:  Negative for cough, shortness of breath and wheezing.    Cardiovascular:  Negative for palpitations and leg swelling.   Gastrointestinal:  Negative for abdominal pain, constipation, diarrhea, nausea and vomiting.   Endocrine: Positive for cold intolerance. Negative for heat intolerance, polyphagia and polyuria.   Musculoskeletal:  Negative for arthralgias.   Skin:  Negative for color change, rash and wound.   Neurological:  Negative for dizziness, tremors, weakness, light-headedness, numbness and headaches.   Psychiatric/Behavioral:  Negative for agitation and sleep disturbance. The patient is not nervous/anxious.        Video Exam    There were no vitals filed for this visit.    Physical Exam  Constitutional:       General: She is not in acute distress.     Appearance: Normal appearance. She is not ill-appearing, toxic-appearing or diaphoretic.   HENT:      Head: Normocephalic and atraumatic.   Eyes:      Extraocular Movements: Extraocular movements intact.   Pulmonary:      Effort: Pulmonary effort is normal. No respiratory distress.   Neurological:      Mental Status: She is alert and oriented to person, place, and time.            Component      Latest Ref Rng 5/6/2021 6/14/2021 9/16/2021 6/9/2022 9/6/2022   TSH 3RD GENERATON      0.450 - 4.500 uIU/mL >500.000 (H)  252.428 (H)  2.194  3.512  3.193    Free T4      0.61 - 1.12 ng/dL 0.16 (LL)  0.61 (L)   1.20  1.28    PROLACTIN        ng/mL 21.3        T3, Total      0.60 - 1.80 ng/mL    1.30       Component      Latest Ref Rng 2/1/2023 9/29/2023 11/22/2023 2/5/2024   TSH 3RD GENERATON      0.450 - 4.500 uIU/mL 235.000 (H)  68.166 (H)  0.329 (L)  0.455    Free T4      0.61 - 1.12 ng/dL 0.53 (L)  0.70       PROLACTIN        ng/mL       T3, Total      0.60 - 1.80 ng/mL          Narrative & Impression   THYROID ULTRASOUND   INDICATION- Thyromegaly.  History of Hashimoto's thyroiditis.   COMPARISON- None.   TECHNIQUE-   Ultrasound of the thyroid was performed with a high   frequency linear transducer in transverse and sagittal planes including   volumetric imaging sweeps as well as traditional still imaging   technique.   FINDINGS-   Thyroid parenchyma is diffusely heterogeneous in echotexture.   Right gland-  4.3 x 1.5 x 1.9 cm.    No dominant nodules.   Left gland-  5.0 x 2.0 x 1.9 cm.   No dominant nodules.   Isthmus- 0.8 cm in AP dimension.   No dominant nodules.   IMPRESSION-   Heterogeneous thyroid gland, with slight asymmetric enlargement of the   left lobe.  No discrete lesions identified.   Transcribed on- [id number]           Visit Time  Total Visit Duration: 30 minutes

## 2024-02-21 PROBLEM — Z01.419 ENCOUNTER FOR ANNUAL ROUTINE GYNECOLOGICAL EXAMINATION: Status: RESOLVED | Noted: 2023-07-24 | Resolved: 2024-02-21

## 2024-02-26 ENCOUNTER — OFFICE VISIT (OUTPATIENT)
Age: 34
End: 2024-02-26
Payer: COMMERCIAL

## 2024-02-26 ENCOUNTER — NURSE TRIAGE (OUTPATIENT)
Age: 34
End: 2024-02-26

## 2024-02-26 VITALS
WEIGHT: 174.2 LBS | DIASTOLIC BLOOD PRESSURE: 70 MMHG | BODY MASS INDEX: 30.87 KG/M2 | SYSTOLIC BLOOD PRESSURE: 104 MMHG | HEIGHT: 63 IN

## 2024-02-26 DIAGNOSIS — N93.0 POSTCOITAL AND CONTACT BLEEDING: Primary | ICD-10-CM

## 2024-02-26 PROCEDURE — 99213 OFFICE O/P EST LOW 20 MIN: CPT

## 2024-02-26 NOTE — PROGRESS NOTES
Assessment/Plan:    Postcoital and contact bleeding  -no active bleeding noted. No cervical friability. Normal findings on pelvic exam. Declined STD screening. Advised patient to monitor for continued bleeding. Consider TVUS if postcoital bleeding continues.        Diagnoses and all orders for this visit:    Postcoital and contact bleeding          Subjective:      Patient ID: Antonia Berman is a 33 y.o. female here for postcoital bleeding. First occurrence was yesterday. She noted menstrual like bleeding during and immediately after intercourse. She denies any pain during IC or cramping after. She has had spotting since then. Denies vaginal discharge, itching, odor, or irritation. Monogamous relationship and no concern for STDs. She denies any new soaps or feminine products. They did not use any new lubricants or condoms. She admits IC was more vigorous than usual. She denies abdominal/pelvic pain.   Patient's last menstrual period was 02/11/2024 (approximate).      The following portions of the patient's history were reviewed and updated as appropriate: She  has a past medical history of Sinus tachycardia.  She   Patient Active Problem List    Diagnosis Date Noted    Postcoital and contact bleeding 02/26/2024    Encounter for preconception consultation 07/24/2023    Burn 10/07/2022    Fatigue 06/09/2022    Annual physical exam 05/06/2021    Eustachian tube dysfunction, unspecified laterality 04/09/2018    Hypothyroidism due to Hashimoto's thyroiditis 11/24/2014     She  has no past surgical history on file.  Her family history includes Colon cancer in her maternal uncle.  She  reports that she has never smoked. She has never used smokeless tobacco. She reports that she does not currently use alcohol. She reports that she does not use drugs.  Current Outpatient Medications   Medication Sig Dispense Refill    Ascorbic Acid (VITAMIN C PO) Take by mouth      Cholecalciferol (VITAMIN D3 PO) Take by mouth       "Ferrous Gluconate (IRON 27 PO) Take by mouth      levothyroxine 112 mcg tablet Take 1 tablet (112 mcg total) by mouth daily 90 tablet 0    Multiple Vitamins-Minerals (ZINC PO) Take by mouth       No current facility-administered medications for this visit.     She is allergic to other and penicillins..    Review of Systems   Constitutional:  Negative for chills and fever.   Gastrointestinal:  Negative for abdominal pain and blood in stool.   Genitourinary:  Positive for vaginal bleeding. Negative for dyspareunia, dysuria, genital sores, hematuria, menstrual problem, pelvic pain, vaginal discharge and vaginal pain.   Skin:  Negative for rash.   All other systems reviewed and are negative.        Objective:      /70 (BP Location: Left arm, Patient Position: Sitting)   Ht 5' 3\" (1.6 m)   Wt 79 kg (174 lb 3.2 oz)   LMP 02/11/2024 (Approximate)   BMI 30.86 kg/m²          Physical Exam  Vitals and nursing note reviewed.   Constitutional:       General: She is not in acute distress.     Appearance: Normal appearance. She is not ill-appearing.   HENT:      Head: Normocephalic and atraumatic.   Eyes:      Conjunctiva/sclera: Conjunctivae normal.   Pulmonary:      Effort: Pulmonary effort is normal.   Abdominal:      Palpations: Abdomen is soft.      Tenderness: There is no abdominal tenderness.   Genitourinary:     General: Normal vulva.      Exam position: Lithotomy position.      Labia:         Right: No rash, tenderness, lesion or injury.         Left: No rash, tenderness, lesion or injury.       Urethra: No prolapse, urethral pain, urethral swelling or urethral lesion.      Vagina: No signs of injury and foreign body. Bleeding (old blood present, no active signs of vaginal bleeding) present. No vaginal discharge, erythema, tenderness, lesions or prolapsed vaginal walls.      Cervix: No cervical motion tenderness, discharge, friability, lesion, erythema, cervical bleeding or eversion.      Uterus: Not deviated, " not enlarged, not fixed, not tender and no uterine prolapse.       Adnexa:         Right: No mass, tenderness or fullness.          Left: No mass, tenderness or fullness.     Musculoskeletal:         General: Normal range of motion.      Cervical back: Neck supple.   Lymphadenopathy:      Lower Body: No right inguinal adenopathy. No left inguinal adenopathy.   Skin:     General: Skin is warm and dry.   Neurological:      General: No focal deficit present.      Mental Status: She is alert.   Psychiatric:         Mood and Affect: Mood normal.         Behavior: Behavior normal.

## 2024-02-26 NOTE — TELEPHONE ENCOUNTER
"Patient called regarding spotting after intercourse yesterday AM and on going since then. Patient states LMP 2 weeks ago. Patient had some cramping yesterday after intercourse, but has now subsided. Care advice reviewed. Same day appointment scheduled @4:15 pm with provider. Patient verbalized understanding.    Reason for Disposition   Bleeding or spotting occurs after sex (Exception: first intercourse)    Answer Assessment - Initial Assessment Questions  1. AMOUNT: \"Describe the bleeding that you are having.\"     - SPOTTING: spotting, or pinkish / brownish mucous discharge; does not fill panti-liner or pad     - MILD:  less than 1 pad / hour; less than patient's usual menstrual bleeding    - MODERATE: 1-2 pads / hour; 1 menstrual cup every 6 hours; small-medium blood clots (e.g., pea, grape, small coin)    - SEVERE: soaking 2 or more pads/hour for 2 or more hours; 1 menstrual cup every 2 hours; bleeding not contained by pads or continuous red blood from vagina; large blood clots (e.g., golf ball, large coin)       Spotting   2. ONSET: \"When did the bleeding begin?\" \"Is it continuing now?\"      Yesterday AM after intercourse   3. MENSTRUAL PERIOD: \"When was the last normal menstrual period?\" \"How is this different than your period?\"      2 weeks ago   4. REGULARITY: \"How regular are your periods?\"      Regular cycles  5. ABDOMINAL PAIN: \"Do you have any pain?\" \"How bad is the pain?\"  (e.g., Scale 1-10; mild, moderate, or severe)    - MILD (1-3): doesn't interfere with normal activities, abdomen soft and not tender to touch     - MODERATE (4-7): interferes with normal activities or awakens from sleep, tender to touch     - SEVERE (8-10): excruciating pain, doubled over, unable to do any normal activities       Abdominal pain like cramping yesterday, but now subsided  6. PREGNANCY: \"Could you be pregnant?\" \"Are you sexually active?\" \"Did you recently give birth?\"      Yes, sexually active  7. BREASTFEEDING: \"Are you " "breastfeeding?\"      denies  8. HORMONES: \"Are you taking any hormone medications, prescription or OTC?\" (e.g., birth control pills, estrogen)      denies  9. BLOOD THINNERS: \"Do you take any blood thinners?\" (e.g., Coumadin/warfarin, Pradaxa/dabigatran, aspirin)      denies  10. CAUSE: \"What do you think is causing the bleeding?\" (e.g., recent gyn surgery, recent gyn procedure; known bleeding disorder, cervical cancer, polycystic ovarian disease, fibroids)          intercourse  11. HEMODYNAMIC STATUS: \"Are you weak or feeling lightheaded?\" If Yes, ask: \"Can you stand and walk normally?\"         denies  12. OTHER SYMPTOMS: \"What other symptoms are you having with the bleeding?\" (e.g., passed tissue, vaginal discharge, fever, menstrual-type cramps)        denies    Protocols used: Vaginal Bleeding - Abnormal-ADULT-[place]    "

## 2024-02-26 NOTE — ASSESSMENT & PLAN NOTE
-no active bleeding noted. No cervical friability. Normal findings on pelvic exam. Declined STD screening. Advised patient to monitor for continued bleeding. Consider TVUS if postcoital bleeding continues.

## 2024-02-26 NOTE — PATIENT INSTRUCTIONS
Perineal Hygiene     No soaps or feminine wash to the vulva. Use only water to cleanse, or water with Dove or Dove Sensitive Skin Bar soap if necessary.    No lotion to the area.  Use only coconut oil for moisture if needed   No douching     Cotton underware, loose fitting clothing  Only perfume-free, dye-free laundry detergent, use a second rinse cycle   Avoid fabric softeners/dryer sheets.      Coconut oil as a lubricant (if not using condoms) or another scent-free lubricant (Astroglide, Uberlube) if needed.    Partner to avoid the same products as well.      Over the counter probiotic to restore vaginal kathi may be helpful as well     You may also look into Boric Acid vaginal suppositories to restore vaginal PH balance for up to 2 weeks as directed on the box. You may not use these if you are pregnant

## 2024-04-05 ENCOUNTER — APPOINTMENT (OUTPATIENT)
Age: 34
End: 2024-04-05
Payer: COMMERCIAL

## 2024-04-05 DIAGNOSIS — E03.8 HYPOTHYROIDISM DUE TO HASHIMOTO'S THYROIDITIS: ICD-10-CM

## 2024-04-05 DIAGNOSIS — E06.3 HYPOTHYROIDISM DUE TO HASHIMOTO'S THYROIDITIS: ICD-10-CM

## 2024-04-05 LAB
T4 FREE SERPL-MCNC: 1.12 NG/DL (ref 0.61–1.12)
TSH SERPL DL<=0.05 MIU/L-ACNC: 0.18 UIU/ML (ref 0.45–4.5)

## 2024-04-05 PROCEDURE — 84439 ASSAY OF FREE THYROXINE: CPT

## 2024-04-05 PROCEDURE — 36415 COLL VENOUS BLD VENIPUNCTURE: CPT

## 2024-04-05 PROCEDURE — 84443 ASSAY THYROID STIM HORMONE: CPT

## 2024-04-06 ENCOUNTER — OFFICE VISIT (OUTPATIENT)
Age: 34
End: 2024-04-06
Payer: COMMERCIAL

## 2024-04-06 VITALS
OXYGEN SATURATION: 97 % | HEART RATE: 84 BPM | WEIGHT: 172.6 LBS | DIASTOLIC BLOOD PRESSURE: 67 MMHG | RESPIRATION RATE: 18 BRPM | TEMPERATURE: 96.5 F | BODY MASS INDEX: 30.57 KG/M2 | SYSTOLIC BLOOD PRESSURE: 100 MMHG

## 2024-04-06 DIAGNOSIS — M79.604 RIGHT LEG PAIN: Primary | ICD-10-CM

## 2024-04-06 PROCEDURE — 99213 OFFICE O/P EST LOW 20 MIN: CPT | Performed by: PHYSICIAN ASSISTANT

## 2024-04-06 NOTE — PATIENT INSTRUCTIONS
Right leg pain  Referred to the ER  Follow up with PCP in 3-5 days.  Proceed to  ER if symptoms worsen.

## 2024-04-06 NOTE — PROGRESS NOTES
[facility]        NAME: Antonia Berman is a 33 y.o. female  : 1990    MRN: [id number]  DATE: 2024  TIME: 10:19 AM    Assessment and Plan   Right leg pain [M79.604]  1. Right leg pain              Patient Instructions     Right leg pain  Referred to the ER  Follow up with PCP in 3-5 days.  Proceed to  ER if symptoms worsen.    Chief Complaint     Chief Complaint   Patient presents with    Bleeding/Bruising     Bruising right above  right medial knee area. Bruise in the shape of a [place] with some yellowing pink and purple. States Wednesday night is when she found the bruise. Doesn't report any injury to the area. Thursday started with sharp pain from medial thigh down to ankle with periods of numbness and tingling ro calf area. Used heat which helped. Ibuprofen with last dose last night         History of Present Illness       33-year-old female who presents complaining of right lower leg pain.  Patient states that the pain started yesterday and denies any history of trauma, overuse, pulling.  Patient denies chest pain, palpitations        Review of Systems   Review of Systems   Constitutional: Negative.    HENT: Negative.     Eyes: Negative.    Respiratory: Negative.  Negative for cough, chest tightness, shortness of breath, wheezing and stridor.    Cardiovascular: Negative.  Negative for chest pain, palpitations and leg swelling.         Current Medications       Current Outpatient Medications:     Ascorbic Acid (VITAMIN C PO), Take by mouth, Disp: , Rfl:     Cholecalciferol (VITAMIN D3 PO), Take by mouth, Disp: , Rfl:     Ferrous Gluconate (IRON 27 PO), Take by mouth, Disp: , Rfl:     levothyroxine 112 mcg tablet, Take 1 tablet (112 mcg total) by mouth daily, Disp: 90 tablet, Rfl: 0    Multiple Vitamins-Minerals (ZINC PO), Take by mouth, Disp: , Rfl:     Current Allergies     Allergies as of 2024 - Reviewed 2024   Allergen Reaction Noted    Other Anaphylaxis 2021     Penicillins Hives 11/24/2014            The following portions of the patient's history were reviewed and updated as appropriate: allergies, current medications, past family history, past medical history, past social history, past surgical history and problem list.     Past Medical History:   Diagnosis Date    Disease of thyroid gland     Sinus tachycardia        History reviewed. No pertinent surgical history.    Family History   Problem Relation Age of Onset    Colon cancer Maternal Uncle     Breast cancer Neg Hx     Ovarian cancer Neg Hx     Uterine cancer Neg Hx     Cervical cancer Neg Hx          Medications have been verified.        Objective   /67   Pulse 84   Temp (!) 96.5 °F (35.8 °C)   Resp 18   Wt 78.3 kg (172 lb 9.6 oz)   SpO2 97%   BMI 30.57 kg/m²        Physical Exam     Physical Exam  Constitutional:       Appearance: She is well-developed.   HENT:      Head: Normocephalic and atraumatic.   Cardiovascular:      Rate and Rhythm: Normal rate and regular rhythm.      Heart sounds: Normal heart sounds.   Pulmonary:      Effort: Pulmonary effort is normal. No respiratory distress.      Breath sounds: Normal breath sounds. No wheezing or rales.   Chest:      Chest wall: No tenderness.   Musculoskeletal:      Cervical back: Normal range of motion and neck supple.        Legs:    Lymphadenopathy:      Cervical: No cervical adenopathy.

## 2024-04-08 ENCOUNTER — OFFICE VISIT (OUTPATIENT)
Dept: FAMILY MEDICINE CLINIC | Facility: CLINIC | Age: 34
End: 2024-04-08
Payer: COMMERCIAL

## 2024-04-08 VITALS
BODY MASS INDEX: 30.48 KG/M2 | DIASTOLIC BLOOD PRESSURE: 62 MMHG | OXYGEN SATURATION: 98 % | SYSTOLIC BLOOD PRESSURE: 112 MMHG | HEART RATE: 84 BPM | HEIGHT: 63 IN | WEIGHT: 172 LBS | TEMPERATURE: 98.4 F

## 2024-04-08 DIAGNOSIS — T14.8XXA BRUISE: Primary | ICD-10-CM

## 2024-04-08 DIAGNOSIS — M79.10 MUSCLE TENSION PAIN: ICD-10-CM

## 2024-04-08 PROCEDURE — 99213 OFFICE O/P EST LOW 20 MIN: CPT | Performed by: STUDENT IN AN ORGANIZED HEALTH CARE EDUCATION/TRAINING PROGRAM

## 2024-04-08 NOTE — PROGRESS NOTES
Assessment/Plan:         Problem List Items Addressed This Visit    None  Visit Diagnoses     Bruise    -  Primary    Muscle tension pain              Negative homans sign, nos welling or erytghema of the RLE so very low suspicion fro DVT.  Warm compress, NSAIDs and increase traction    Subjective:      Patient ID: Antonia Berman is a 33 y.o. female.    HPI    Patient coming to have a bruise on her right leg looked at.  Last Wednesday she noticed that there was a bruise and last Thursday started having tightness in her calf and posterior thigh.  Went to an urgent care who told her to go to the hospital to rule out a blood clot.  Patient has been using warm compresses and NSAIDs with good relief.  Denies any recent travel or antibiotic use.  Denies any trauma.    The following portions of the patient's history were reviewed and updated as appropriate:   Past Medical History:  She has a past medical history of Disease of thyroid gland and Sinus tachycardia.,  _______________________________________________________________________  Medical Problems:  does not have any pertinent problems on file.,  _______________________________________________________________________  Past Surgical History:   has no past surgical history on file.,  _______________________________________________________________________  Family History:  family history includes Colon cancer in her maternal uncle.,  _______________________________________________________________________  Social History:   reports that she has never smoked. She has never used smokeless tobacco. She reports that she does not currently use alcohol. She reports that she does not use drugs.,  _______________________________________________________________________  Allergies:  is allergic to other and penicillins..  _______________________________________________________________________  Current Outpatient Medications   Medication Sig Dispense Refill   • Ascorbic Acid  "(VITAMIN C PO) Take by mouth     • Cholecalciferol (VITAMIN D3 PO) Take by mouth     • Ferrous Gluconate (IRON 27 PO) Take by mouth     • levothyroxine 112 mcg tablet Take 1 tablet (112 mcg total) by mouth daily 90 tablet 0   • Multiple Vitamins-Minerals (ZINC PO) Take by mouth       No current facility-administered medications for this visit.     _______________________________________________________________________  Review of Systems   Constitutional:  Negative for activity change, appetite change, fatigue and fever.   HENT:  Negative for congestion, rhinorrhea and sore throat.    Eyes:  Negative for visual disturbance.   Respiratory:  Negative for cough and shortness of breath.    Cardiovascular:  Negative for chest pain.   Gastrointestinal:  Negative for nausea and vomiting.         Objective:  Vitals:    04/08/24 0950   BP: 112/62   Pulse: 84   Temp: 98.4 °F (36.9 °C)   SpO2: 98%   Weight: 78 kg (172 lb)   Height: 5' 3\" (1.6 m)     Body mass index is 30.47 kg/m².     Physical Exam  Constitutional:       General: She is not in acute distress.     Appearance: Normal appearance. She is not ill-appearing.   Musculoskeletal:      Right upper leg: Normal.      Right knee: Normal.      Right lower leg: Normal.      Right ankle: Normal.   Skin:     Findings: Bruising and ecchymosis present.          Neurological:      Mental Status: She is alert.         "

## 2024-05-27 DIAGNOSIS — E03.9 HYPOTHYROIDISM (ACQUIRED): ICD-10-CM

## 2024-05-28 RX ORDER — LEVOTHYROXINE SODIUM 112 UG/1
112 TABLET ORAL DAILY
Qty: 90 TABLET | Refills: 1 | Status: SHIPPED | OUTPATIENT
Start: 2024-05-28

## 2024-06-11 DIAGNOSIS — E03.8 HYPOTHYROIDISM DUE TO HASHIMOTO'S THYROIDITIS: Primary | ICD-10-CM

## 2024-06-11 DIAGNOSIS — E06.3 HYPOTHYROIDISM DUE TO HASHIMOTO'S THYROIDITIS: Primary | ICD-10-CM

## 2024-06-15 ENCOUNTER — APPOINTMENT (OUTPATIENT)
Dept: LAB | Facility: HOSPITAL | Age: 34
End: 2024-06-15
Payer: COMMERCIAL

## 2024-06-15 LAB — TSH SERPL DL<=0.05 MIU/L-ACNC: 0.49 UIU/ML (ref 0.45–4.5)

## 2024-06-15 PROCEDURE — 84443 ASSAY THYROID STIM HORMONE: CPT | Performed by: STUDENT IN AN ORGANIZED HEALTH CARE EDUCATION/TRAINING PROGRAM

## 2024-06-15 PROCEDURE — 84439 ASSAY OF FREE THYROXINE: CPT | Performed by: STUDENT IN AN ORGANIZED HEALTH CARE EDUCATION/TRAINING PROGRAM

## 2024-06-15 PROCEDURE — 36415 COLL VENOUS BLD VENIPUNCTURE: CPT | Performed by: STUDENT IN AN ORGANIZED HEALTH CARE EDUCATION/TRAINING PROGRAM

## 2024-06-16 LAB — T4 FREE SERPL-MCNC: 1.08 NG/DL (ref 0.61–1.12)

## 2024-06-18 ENCOUNTER — APPOINTMENT (OUTPATIENT)
Dept: LAB | Facility: HOSPITAL | Age: 34
End: 2024-06-18
Payer: COMMERCIAL

## 2024-06-18 ENCOUNTER — OFFICE VISIT (OUTPATIENT)
Age: 34
End: 2024-06-18
Payer: COMMERCIAL

## 2024-06-18 ENCOUNTER — OFFICE VISIT (OUTPATIENT)
Dept: FAMILY MEDICINE CLINIC | Facility: CLINIC | Age: 34
End: 2024-06-18
Payer: COMMERCIAL

## 2024-06-18 VITALS
DIASTOLIC BLOOD PRESSURE: 68 MMHG | WEIGHT: 162.2 LBS | SYSTOLIC BLOOD PRESSURE: 110 MMHG | HEART RATE: 87 BPM | HEIGHT: 63 IN | BODY MASS INDEX: 28.74 KG/M2 | OXYGEN SATURATION: 97 %

## 2024-06-18 VITALS
DIASTOLIC BLOOD PRESSURE: 80 MMHG | HEIGHT: 63 IN | HEART RATE: 82 BPM | WEIGHT: 163 LBS | BODY MASS INDEX: 28.88 KG/M2 | TEMPERATURE: 98.3 F | SYSTOLIC BLOOD PRESSURE: 122 MMHG | OXYGEN SATURATION: 98 %

## 2024-06-18 DIAGNOSIS — L25.9 CONTACT DERMATITIS, UNSPECIFIED CONTACT DERMATITIS TYPE, UNSPECIFIED TRIGGER: ICD-10-CM

## 2024-06-18 DIAGNOSIS — E06.3 HYPOTHYROIDISM DUE TO HASHIMOTO'S THYROIDITIS: ICD-10-CM

## 2024-06-18 DIAGNOSIS — L01.00 IMPETIGO: Primary | ICD-10-CM

## 2024-06-18 DIAGNOSIS — R53.82 CHRONIC FATIGUE: ICD-10-CM

## 2024-06-18 DIAGNOSIS — E03.8 HYPOTHYROIDISM DUE TO HASHIMOTO'S THYROIDITIS: ICD-10-CM

## 2024-06-18 DIAGNOSIS — R53.83 OTHER FATIGUE: Primary | ICD-10-CM

## 2024-06-18 LAB
ALBUMIN SERPL BCP-MCNC: 4.3 G/DL (ref 3.5–5)
ALP SERPL-CCNC: 67 U/L (ref 34–104)
ALT SERPL W P-5'-P-CCNC: 7 U/L (ref 7–52)
ANION GAP SERPL CALCULATED.3IONS-SCNC: 6 MMOL/L (ref 4–13)
AST SERPL W P-5'-P-CCNC: 14 U/L (ref 13–39)
BASOPHILS # BLD AUTO: 0.05 THOUSANDS/ÂΜL (ref 0–0.1)
BASOPHILS NFR BLD AUTO: 1 % (ref 0–1)
BILIRUB SERPL-MCNC: 0.84 MG/DL (ref 0.2–1)
BUN SERPL-MCNC: 11 MG/DL (ref 5–25)
CALCIUM SERPL-MCNC: 9.7 MG/DL (ref 8.4–10.2)
CHLORIDE SERPL-SCNC: 104 MMOL/L (ref 96–108)
CHOLEST SERPL-MCNC: 165 MG/DL
CO2 SERPL-SCNC: 29 MMOL/L (ref 21–32)
CREAT SERPL-MCNC: 0.75 MG/DL (ref 0.6–1.3)
EOSINOPHIL # BLD AUTO: 0.2 THOUSAND/ÂΜL (ref 0–0.61)
EOSINOPHIL NFR BLD AUTO: 2 % (ref 0–6)
ERYTHROCYTE [DISTWIDTH] IN BLOOD BY AUTOMATED COUNT: 12.2 % (ref 11.6–15.1)
FERRITIN SERPL-MCNC: 99 NG/ML (ref 11–307)
GFR SERPL CREATININE-BSD FRML MDRD: 104 ML/MIN/1.73SQ M
GLUCOSE P FAST SERPL-MCNC: 91 MG/DL (ref 65–99)
HCT VFR BLD AUTO: 39.4 % (ref 34.8–46.1)
HDLC SERPL-MCNC: 60 MG/DL
HGB BLD-MCNC: 13 G/DL (ref 11.5–15.4)
IMM GRANULOCYTES # BLD AUTO: 0.03 THOUSAND/UL (ref 0–0.2)
IMM GRANULOCYTES NFR BLD AUTO: 0 % (ref 0–2)
IRON SATN MFR SERPL: 26 % (ref 15–50)
IRON SERPL-MCNC: 80 UG/DL (ref 50–212)
LDLC SERPL CALC-MCNC: 73 MG/DL (ref 0–100)
LYMPHOCYTES # BLD AUTO: 1.94 THOUSANDS/ÂΜL (ref 0.6–4.47)
LYMPHOCYTES NFR BLD AUTO: 24 % (ref 14–44)
MCH RBC QN AUTO: 30.5 PG (ref 26.8–34.3)
MCHC RBC AUTO-ENTMCNC: 33 G/DL (ref 31.4–37.4)
MCV RBC AUTO: 93 FL (ref 82–98)
MONOCYTES # BLD AUTO: 0.61 THOUSAND/ÂΜL (ref 0.17–1.22)
MONOCYTES NFR BLD AUTO: 7 % (ref 4–12)
NEUTROPHILS # BLD AUTO: 5.36 THOUSANDS/ÂΜL (ref 1.85–7.62)
NEUTS SEG NFR BLD AUTO: 66 % (ref 43–75)
NONHDLC SERPL-MCNC: 105 MG/DL
NRBC BLD AUTO-RTO: 0 /100 WBCS
PLATELET # BLD AUTO: 411 THOUSANDS/UL (ref 149–390)
PMV BLD AUTO: 9.8 FL (ref 8.9–12.7)
POTASSIUM SERPL-SCNC: 4.4 MMOL/L (ref 3.5–5.3)
PROT SERPL-MCNC: 7.4 G/DL (ref 6.4–8.4)
RBC # BLD AUTO: 4.26 MILLION/UL (ref 3.81–5.12)
SODIUM SERPL-SCNC: 139 MMOL/L (ref 135–147)
TIBC SERPL-MCNC: 306 UG/DL (ref 250–450)
TRIGL SERPL-MCNC: 160 MG/DL
UIBC SERPL-MCNC: 226 UG/DL (ref 155–355)
WBC # BLD AUTO: 8.19 THOUSAND/UL (ref 4.31–10.16)

## 2024-06-18 PROCEDURE — 99214 OFFICE O/P EST MOD 30 MIN: CPT | Performed by: FAMILY MEDICINE

## 2024-06-18 PROCEDURE — 99213 OFFICE O/P EST LOW 20 MIN: CPT | Performed by: STUDENT IN AN ORGANIZED HEALTH CARE EDUCATION/TRAINING PROGRAM

## 2024-06-18 PROCEDURE — 80061 LIPID PANEL: CPT

## 2024-06-18 PROCEDURE — 83540 ASSAY OF IRON: CPT

## 2024-06-18 PROCEDURE — 82728 ASSAY OF FERRITIN: CPT

## 2024-06-18 PROCEDURE — 36415 COLL VENOUS BLD VENIPUNCTURE: CPT

## 2024-06-18 PROCEDURE — 83550 IRON BINDING TEST: CPT

## 2024-06-18 PROCEDURE — 85025 COMPLETE CBC W/AUTO DIFF WBC: CPT

## 2024-06-18 PROCEDURE — 80053 COMPREHEN METABOLIC PANEL: CPT

## 2024-06-18 RX ORDER — METHYLPREDNISOLONE 4 MG/1
TABLET ORAL
Qty: 21 EACH | Refills: 0 | Status: SHIPPED | OUTPATIENT
Start: 2024-06-18

## 2024-06-18 NOTE — PROGRESS NOTES
Antonia Berman 33 y.o. female MRN: [id number]    Encounter: [id number]      Assessment & Plan     Problem List Items Addressed This Visit          Endocrine    Hypothyroidism due to Hashimoto's thyroiditis     Currently on levothyroxine 112 mcg once daily 6 days a weeks and on seventh day 1/2 pill, she is biochemically euthyroid, she reports fatigue for last couple of weeks, which I do not suspect this is related to her thyroid as TSH and free T4 are within goal,  She is has been under stress, as for her father was diagnosed with cancer and is currently admitted for sepsis.  We will continue levothyroxine at current dose.  Check TSH and free T4 in 3 months.           Relevant Orders    T4, free    TSH, 3rd generation       Other    Fatigue - Primary    Relevant Orders    Cortisol Level, AM Specimen       CC:   Hypothyroidism     History of Present Illness     HPI:      Antonia Berman is a 33 year - old female who presents for follow up for hypothyroidism,  She is currently on levothyroxine 112 mcg x 6 and 1/2 on Sundays,       Review of Systems   Constitutional:  Positive for fatigue. Negative for activity change and unexpected weight change.   HENT:  Negative for trouble swallowing and voice change.    Cardiovascular:  Negative for chest pain, palpitations and leg swelling.   Gastrointestinal:  Negative for abdominal pain, diarrhea, nausea and vomiting.   Endocrine: Positive for cold intolerance. Negative for heat intolerance.   Psychiatric/Behavioral:  Negative for sleep disturbance. The patient is not nervous/anxious.        Historical Information   Past Medical History:   Diagnosis Date    Disease of thyroid gland     Sinus tachycardia      History reviewed. No pertinent surgical history.  Social History   Social History     Substance and Sexual Activity   Alcohol Use Not Currently    Comment: rare     Social History     Substance and Sexual Activity   Drug Use Never     Social History     Tobacco Use  "  Smoking Status Never   Smokeless Tobacco Never     Family History:   Family History   Problem Relation Age of Onset    Colon cancer Maternal Uncle     Breast cancer Neg Hx     Ovarian cancer Neg Hx     Uterine cancer Neg Hx     Cervical cancer Neg Hx        Meds/Allergies   Current Outpatient Medications   Medication Sig Dispense Refill    Ascorbic Acid (VITAMIN C PO) Take by mouth      Cholecalciferol (VITAMIN D3 PO) Take by mouth      Ferrous Gluconate (IRON 27 PO) Take by mouth      levothyroxine 112 mcg tablet Take 1 tablet by mouth once daily 90 tablet 1    methylPREDNISolone 4 MG tablet therapy pack Use as directed on package 21 each 0    Multiple Vitamins-Minerals (ZINC PO) Take by mouth      mupirocin (BACTROBAN) 2 % ointment Apply topically 3 (three) times a day 30 g 7     No current facility-administered medications for this visit.     Allergies   Allergen Reactions    Other Anaphylaxis     Green Peppers    Penicillins Hives       Objective   Vitals: Blood pressure 110/68, pulse 87, height 5' 3\" (1.6 m), weight 73.6 kg (162 lb 3.2 oz), SpO2 97%.    Physical Exam  Constitutional:       Appearance: She is well-developed.   HENT:      Head: Normocephalic and atraumatic.      Nose: Nose normal.   Eyes:      Extraocular Movements: EOM normal.      Pupils: Pupils are equal, round, and reactive to light.   Neck:      Thyroid: No thyromegaly.      Vascular: No JVD.   Cardiovascular:      Rate and Rhythm: Normal rate and regular rhythm.      Heart sounds: Normal heart sounds. No murmur heard.     No friction rub. No gallop.   Pulmonary:      Effort: Pulmonary effort is normal. No respiratory distress.      Breath sounds: Normal breath sounds. No stridor. No wheezing or rales.   Chest:      Chest wall: No tenderness.   Abdominal:      General: Bowel sounds are normal. There is no distension.      Palpations: Abdomen is soft. There is no mass.      Tenderness: There is no abdominal tenderness. There is no guarding. " "  Musculoskeletal:         General: No deformity or edema. Normal range of motion.      Cervical back: Normal range of motion.   Skin:     General: Skin is warm.   Neurological:      Mental Status: She is alert and oriented to person, place, and time.       The history was obtained from the review of the chart, patient.    Lab Results:   Lab Results   Component Value Date/Time    TSH 3RD GENERATON 0.495 06/15/2024 08:05 AM    TSH 3RD GENERATON 0.183 (L) 04/05/2024 07:51 AM    TSH 3RD GENERATON 0.455 02/05/2024 08:02 AM    Free T4 1.08 06/15/2024 08:05 AM    Free T4 1.12 04/05/2024 07:51 AM    Free T4 0.70 09/29/2023 08:00 AM       Imaging Studies:       I have personally reviewed pertinent reports.      Portions of the record may have been created with voice recognition software. Occasional wrong word or \"sound a like\" substitutions may have occurred due to the inherent limitations of voice recognition software. Read the chart carefully and recognize, using context, where substitutions have occurred.    "

## 2024-06-18 NOTE — ASSESSMENT & PLAN NOTE
Currently on levothyroxine 112 mcg once daily 6 days a weeks and on seventh day 1/2 pill, she is biochemically euthyroid, she reports fatigue for last couple of weeks, which I do not suspect this is related to her thyroid as TSH and free T4 are within goal,  She is has been under stress, as for her father was diagnosed with cancer and is currently admitted for sepsis.  We will continue levothyroxine at current dose.  Check TSH and free T4 in 3 months.

## 2024-06-18 NOTE — PROGRESS NOTES
"Ambulatory Visit  Name: Antonia Berman      : 1990      MRN: [id number]  Encounter Provider: Marybel Wilde DO  Encounter Date: 2024   Encounter department: [facility] [address] [place] [address]    Assessment & Plan   1. Impetigo  -     mupirocin (BACTROBAN) 2 % ointment; Apply topically 3 (three) times a day  2. Chronic fatigue  -     CBC and differential; Future  -     Comprehensive metabolic panel; Future  -     Iron Panel (Includes Ferritin, Iron Sat%, Iron, and TIBC); Future  -     Lipid panel; Future  3. Contact dermatitis, unspecified contact dermatitis type, unspecified trigger  -     methylPREDNISolone 4 MG tablet therapy pack; Use as directed on package       History of Present Illness     Rash    Patient presents to the office for evaluation. Notes that she started with a facial rash 2 days ago. Notes that this is itchy. Has a small spot on her hand as well. Has been using lotion and ice pace. Denies any changes in vision. Denies pain in the eye.     Reports seasonal allergies and PCN allergy.     Notes that she has been feeling fatigued for awhile now. Would like labs ordered to complete and then go over with PCP. TSH WNL on 6/15/24. States that she is napping once per day. Stress level is high, father has cancer and is hospitalized.     Review of Systems   Skin:  Positive for rash.       Objective     /80 (BP Location: Left arm, Patient Position: Sitting, Cuff Size: Standard)   Pulse 82   Temp 98.3 °F (36.8 °C) (Tympanic)   Ht 5' 3\" (1.6 m)   Wt 73.9 kg (163 lb)   SpO2 98%   BMI 28.87 kg/m²     Physical Exam  Vitals reviewed.   Constitutional:       General: She is not in acute distress.     Appearance: Normal appearance.   HENT:      Head: Normocephalic and atraumatic.      Right Ear: External ear normal.      Left Ear: External ear normal.      Nose: Nose normal.      Mouth/Throat:      Mouth: Mucous membranes are moist.   Eyes:      Extraocular " Movements: Extraocular movements intact.      Conjunctiva/sclera: Conjunctivae normal.   Cardiovascular:      Rate and Rhythm: Normal rate and regular rhythm.      Heart sounds: Normal heart sounds.   Pulmonary:      Effort: Pulmonary effort is normal.      Breath sounds: Normal breath sounds.   Abdominal:      General: Bowel sounds are normal. There is no distension.      Palpations: Abdomen is soft.      Tenderness: There is no abdominal tenderness.   Musculoskeletal:      Right lower leg: No edema.      Left lower leg: No edema.   Skin:     General: Skin is warm.      Capillary Refill: Capillary refill takes less than 2 seconds.      Findings: Rash present.   Neurological:      Mental Status: She is alert. Mental status is at baseline.       Administrative Statements       DO Ko Browne [facility]  6/18/2024 7:47 AM

## 2024-07-08 DIAGNOSIS — L25.9 CONTACT DERMATITIS, UNSPECIFIED CONTACT DERMATITIS TYPE, UNSPECIFIED TRIGGER: ICD-10-CM

## 2024-07-08 RX ORDER — METHYLPREDNISOLONE 4 MG/1
TABLET ORAL
Qty: 21 EACH | Refills: 0 | OUTPATIENT
Start: 2024-07-08

## 2024-07-08 NOTE — TELEPHONE ENCOUNTER
PT called in stating that her skin infection has come back. PT would like to know if Pati could please put in another refill of the following medication: methylPREDNISolone 4 MG tablet therapy pack ?    Script going to the following pharmacy:  [facility] [address] [place], [place] - [facility] ALIZA BURRIS [phone]     Please advise & call pt back with update on her request. Thank you!    Antonia Berman   [phone]

## 2024-07-10 ENCOUNTER — OFFICE VISIT (OUTPATIENT)
Dept: FAMILY MEDICINE CLINIC | Facility: CLINIC | Age: 34
End: 2024-07-10
Payer: COMMERCIAL

## 2024-07-10 VITALS
TEMPERATURE: 98.1 F | DIASTOLIC BLOOD PRESSURE: 74 MMHG | SYSTOLIC BLOOD PRESSURE: 116 MMHG | HEIGHT: 63 IN | OXYGEN SATURATION: 99 % | WEIGHT: 169 LBS | BODY MASS INDEX: 29.95 KG/M2 | HEART RATE: 71 BPM

## 2024-07-10 DIAGNOSIS — L25.9 CONTACT DERMATITIS, UNSPECIFIED CONTACT DERMATITIS TYPE, UNSPECIFIED TRIGGER: Primary | ICD-10-CM

## 2024-07-10 PROCEDURE — 96372 THER/PROPH/DIAG INJ SC/IM: CPT | Performed by: FAMILY MEDICINE

## 2024-07-10 PROCEDURE — 99213 OFFICE O/P EST LOW 20 MIN: CPT | Performed by: FAMILY MEDICINE

## 2024-07-10 RX ORDER — PREDNISONE 10 MG/1
TABLET ORAL
Qty: 30 TABLET | Refills: 0 | Status: SHIPPED | OUTPATIENT
Start: 2024-07-10

## 2024-07-10 RX ORDER — METHYLPREDNISOLONE ACETATE 80 MG/ML
80 INJECTION, SUSPENSION INTRA-ARTICULAR; INTRALESIONAL; INTRAMUSCULAR; SOFT TISSUE ONCE
Status: COMPLETED | OUTPATIENT
Start: 2024-07-10 | End: 2024-07-10

## 2024-07-10 RX ORDER — METHYLPREDNISOLONE ACETATE 80 MG/ML
40 INJECTION, SUSPENSION INTRA-ARTICULAR; INTRALESIONAL; INTRAMUSCULAR; SOFT TISSUE ONCE
Status: DISCONTINUED | OUTPATIENT
Start: 2024-07-10 | End: 2024-07-10

## 2024-07-10 RX ADMIN — METHYLPREDNISOLONE ACETATE 80 MG: 80 INJECTION, SUSPENSION INTRA-ARTICULAR; INTRALESIONAL; INTRAMUSCULAR; SOFT TISSUE at 13:31

## 2024-07-10 NOTE — PROGRESS NOTES
Depression Screening and Follow-up Plan: Patient was screened for depression during today's encounter. They screened negative with a PHQ-2 score of 0.    Assessment/Plan:         Problem List Items Addressed This Visit       Contact dermatitis - Primary    Relevant Medications    predniSONE 10 mg tablet         Subjective:      Patient ID: Antonia Berman is a 33 y.o. female.    Patient comes in with a 3-day history of rash on her right hand, arm and right axilla    Rash        The following portions of the patient's history were reviewed and updated as appropriate:   Past Medical History:  She has a past medical history of Disease of thyroid gland and Sinus tachycardia.,  _______________________________________________________________________  Medical Problems:  does not have any pertinent problems on file.,  _______________________________________________________________________  Past Surgical History:   has no past surgical history on file.,  _______________________________________________________________________  Family History:  family history includes Colon cancer in her maternal uncle.,  _______________________________________________________________________  Social History:   reports that she has never smoked. She has never used smokeless tobacco. She reports that she does not currently use alcohol. She reports that she does not use drugs.,  _______________________________________________________________________  Allergies:  is allergic to other and penicillins..  _______________________________________________________________________  Current Outpatient Medications   Medication Sig Dispense Refill    Ascorbic Acid (VITAMIN C PO) Take by mouth      Cholecalciferol (VITAMIN D3 PO) Take by mouth      Ferrous Gluconate (IRON 27 PO) Take by mouth      levothyroxine 112 mcg tablet Take 1 tablet by mouth once daily 90 tablet 1    Multiple Vitamins-Minerals (ZINC PO) Take by mouth      mupirocin (BACTROBAN) 2 %  "ointment Apply topically 3 (three) times a day 30 g 7    predniSONE 10 mg tablet 4 dailyx3, 3 daily x3, 2 dailx3, 1daily x3 30 tablet 0     No current facility-administered medications for this visit.     _______________________________________________________________________  Review of Systems   Constitutional: Negative.    Respiratory: Negative.     Gastrointestinal: Negative.    Skin:  Positive for rash.         Objective:  Vitals:    07/10/24 1144   BP: 116/74   BP Location: Left arm   Patient Position: Sitting   Cuff Size: Standard   Pulse: 71   Temp: 98.1 °F (36.7 °C)   TempSrc: Tympanic   SpO2: 99%   Weight: 76.7 kg (169 lb)   Height: 5' 3\" (1.6 m)     Body mass index is 29.94 kg/m².     Physical Exam  Constitutional:       Appearance: Normal appearance.   HENT:      Head: Normocephalic and atraumatic.   Skin:     Comments: Red vesicular rash between third and fourth fingers right hand, papules on right hand and right arm red macular rash right axilla   Neurological:      Mental Status: She is alert.   Psychiatric:         Mood and Affect: Mood normal.         Behavior: Behavior normal.         "

## 2024-10-21 ENCOUNTER — APPOINTMENT (OUTPATIENT)
Age: 34
End: 2024-10-21
Payer: COMMERCIAL

## 2024-10-21 DIAGNOSIS — R53.83 OTHER FATIGUE: ICD-10-CM

## 2024-10-21 DIAGNOSIS — E06.3 HYPOTHYROIDISM DUE TO HASHIMOTO'S THYROIDITIS: ICD-10-CM

## 2024-10-21 LAB
CORTIS AM PEAK SERPL-MCNC: 14.4 UG/DL (ref 6.7–22.6)
T4 FREE SERPL-MCNC: 1.11 NG/DL (ref 0.61–1.12)
TSH SERPL DL<=0.05 MIU/L-ACNC: 1.13 UIU/ML (ref 0.45–4.5)

## 2024-10-21 PROCEDURE — 84439 ASSAY OF FREE THYROXINE: CPT

## 2024-10-21 PROCEDURE — 36415 COLL VENOUS BLD VENIPUNCTURE: CPT

## 2024-10-21 PROCEDURE — 84443 ASSAY THYROID STIM HORMONE: CPT

## 2024-10-21 PROCEDURE — 82533 TOTAL CORTISOL: CPT

## 2024-10-22 ENCOUNTER — OFFICE VISIT (OUTPATIENT)
Age: 34
End: 2024-10-22
Payer: COMMERCIAL

## 2024-10-22 VITALS
HEIGHT: 63 IN | TEMPERATURE: 98 F | DIASTOLIC BLOOD PRESSURE: 72 MMHG | WEIGHT: 174 LBS | OXYGEN SATURATION: 97 % | HEART RATE: 96 BPM | SYSTOLIC BLOOD PRESSURE: 110 MMHG | BODY MASS INDEX: 30.83 KG/M2

## 2024-10-22 DIAGNOSIS — E06.3 HYPOTHYROIDISM DUE TO HASHIMOTO'S THYROIDITIS: Primary | ICD-10-CM

## 2024-10-22 PROCEDURE — 99213 OFFICE O/P EST LOW 20 MIN: CPT | Performed by: STUDENT IN AN ORGANIZED HEALTH CARE EDUCATION/TRAINING PROGRAM

## 2024-10-22 NOTE — PROGRESS NOTES
Ambulatory Visit  Name: Antonia Berman      : 1990      MRN: [id number]  Encounter Provider: Cam Flanagan MD  Encounter Date: 10/22/2024   Encounter department: [facility]    Assessment & Plan  Hypothyroidism due to Hashimoto's thyroiditis    Currently on levothyroxine 112 mcg 6 days a week and half a pill on Sundays, her TFT showed TSH of 1.132 and free T4 of 1.11 ng per DL, despite her TFT are within goal, she reports no specific symptoms concerning for undertreated hypothyroidism including fatigue, hair loss, etc.  I do not suspect the symptoms are related to hypothyroidism, we reviewed that JOAQUINA recommends T3 replacement in cases to have convincing symptoms of hypothyroidism despite normal TFT, although this will cause palpitation, increased risk of osteoporosis, and I do not recommend in her case.  She is willing to take levothyroxine 112 mcg daily, which I agree with.  Check TFT in 2 months.  Return back in 5 months.  Advised that levotyroxine should be taken on an empty stomach. Should avoid taking medications like iron, calcium, tums, [id number], PPI at the same time that may decrease absorption of T4. Should separate administration by 4hrs.      Orders:    T4, free; Future    TSH, 3rd generation; Future      History of Present Illness     Antonia Berman is a 33 y.o. female who presents for follow up for hypothyroidism and fatigue,     For hypothyroidism currently on levothyroxine 112 mcg once daily, she reports fatigue, hair loss.      History obtained from : patient  Review of Systems   Constitutional:  Positive for fatigue and unexpected weight change.   Gastrointestinal:  Positive for abdominal pain, constipation, diarrhea and vomiting. Negative for nausea.   Endocrine: Positive for cold intolerance. Negative for polydipsia and polyphagia.   Psychiatric/Behavioral:  Negative for sleep disturbance. The patient is not nervous/anxious.      Medical  History Reviewed by provider this encounter:       Current Outpatient Medications on File Prior to Visit   Medication Sig Dispense Refill    Ascorbic Acid (VITAMIN C PO) Take by mouth      Cholecalciferol (VITAMIN D3 PO) Take by mouth      Ferrous Gluconate (IRON 27 PO) Take by mouth      levothyroxine 112 mcg tablet Take 1 tablet by mouth once daily 90 tablet 1    Multiple Vitamins-Minerals (ZINC PO) Take by mouth      mupirocin (BACTROBAN) 2 % ointment Apply topically 3 (three) times a day (Patient taking differently: Apply topically as needed) 30 g 7    predniSONE 10 mg tablet 4 dailyx3, 3 daily x3, 2 dailx3, 1daily x3 (Patient not taking: Reported on 10/22/2024) 30 tablet 0     No current facility-administered medications on file prior to visit.          Objective     There were no vitals taken for this visit.    Physical Exam  Vitals and nursing note reviewed.   Constitutional:       General: She is not in acute distress.     Appearance: She is well-developed.   HENT:      Head: Normocephalic and atraumatic.   Cardiovascular:      Rate and Rhythm: Normal rate and regular rhythm.   Pulmonary:      Effort: Pulmonary effort is normal. No respiratory distress.   Abdominal:      Palpations: Abdomen is soft.   Musculoskeletal:         General: No swelling.      Cervical back: Neck supple.   Skin:     General: Skin is warm and dry.   Neurological:      Mental Status: She is alert.           Component      Latest Ref Rng 10/21/2024   Cortisol - AM      6.7 - 22.6 ug/dL 14.4    FREE T4      0.61 - 1.12 ng/dL 1.11    TSH 3RD GENERATON      0.450 - 4.500 uIU/mL 1.132

## 2024-10-22 NOTE — ASSESSMENT & PLAN NOTE
Currently on levothyroxine 112 mcg 6 days a week and half a pill on Sundays, her TFT showed TSH of 1.132 and free T4 of 1.11 ng per DL, despite her TFT are within goal, she reports no specific symptoms concerning for undertreated hypothyroidism including fatigue, hair loss, etc.  I do not suspect the symptoms are related to hypothyroidism, we reviewed that JOAQUINA recommends T3 replacement in cases to have convincing symptoms of hypothyroidism despite normal TFT, although this will cause palpitation, increased risk of osteoporosis, and I do not recommend in her case.  She is willing to take levothyroxine 112 mcg daily, which I agree with.  Check TFT in 2 months.  Return back in 5 months.  Advised that levotyroxine should be taken on an empty stomach. Should avoid taking medications like iron, calcium, tums, [id number], PPI at the same time that may decrease absorption of T4. Should separate administration by 4hrs.      Orders:    T4, free; Future    TSH, 3rd generation; Future

## 2024-12-06 DIAGNOSIS — E03.9 HYPOTHYROIDISM (ACQUIRED): ICD-10-CM

## 2024-12-06 RX ORDER — LEVOTHYROXINE SODIUM 112 UG/1
112 TABLET ORAL DAILY
Qty: 90 TABLET | Refills: 1 | Status: SHIPPED | OUTPATIENT
Start: 2024-12-06
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
## 2024-12-17 NOTE — PATIENT INSTRUCTIONS
"If no menstrual cycle by 1/12/25 please take provera as prescribed, monitor for menstrual cycle after completion of medication, usually cycle will occur within 2 weeks  May schedule for US in no menstrual cycle by 1/12/25  Have labs completed at your convenience      Patient Education     Absent or irregular periods   The Basics   Written by the doctors and editors at [facility]   What are absent or irregular periods? -- Absent or irregular periods are periods that do not happen at all or that happen less than 6 to 8 times a year. If you do not get your period for a while, it could be because you are pregnant. Or there might be another reason, such as a medical condition.  What causes absent or irregular periods? -- Absent or irregular periods can be caused by:   Pregnancy   Polycystic ovary syndrome (\"PCOS\") - PCOS is the most common cause of irregular periods. In people with this condition, the ovaries make too much male hormone. This can disrupt monthly periods and cause excess facial hair, acne, and problems with weight.   A decrease in your body's energy supply - This can result from exercising too much, being very stressed, having an eating disorder, or burning more calories than you take in.   Too much prolactin - Prolactin is a hormone made in the \"pituitary gland\", which is a small organ at the base of the brain.   Early or premature menopause - Menopause is the time when a person naturally stops having periods. This normally happens between the ages of 45 and 55. But in some people, it happens earlier. Doctors use the term \"early menopause\" in people who go through menopause between the ages of 40 and 45 years. For people younger than 40, doctors use the term \"premature menopause.\"   Certain types of hormonal birth control - Some forms of birth control can cause absent or irregular periods. This is most common with those that contain only the hormone progestin. Examples include the progestin-only pill " "(sometimes called the \"minipill\"), the implant, and hormone-containing intrauterine devices (IUDs). It is also common with birth control pills that have a lower dose of estrogen. It does not mean that the pill is not working, as long as you are taking it every day.  If you take birth control pills and use \"continuous dosing,\" you will also not get a monthly period. Continuous dosing means skipping the hormone-free pills that come with your prescription, and taking hormone pills every day instead.  Should I see a doctor or nurse? -- See your doctor or nurse if:   You are older than 15 and still have not had your period.   You used to get periods, but you have not had a period for more than 3 months.   Your periods happen more than 45 days apart.  What other symptoms should I watch for? -- Tell your doctor if you:   Think that you might be pregnant   Have family members with irregular periods   Have bad acne or hair on your chest or face   Have gained weight and are having trouble losing it   Have hot flashes, which feel like a wave of heat that starts in your chest and face and then moves through your body   Have night sweats, which are hot flashes that happen when you are asleep   Have new headaches or trouble seeing   Notice milky fluid coming out of your breasts   Are under lots of stress   Have recently lost weight   Are exercising more than you used to   Have changed how much you eat or what kinds of foods you eat   Are taking any medicines, herbs, or vitamins  Are there tests I should have? -- Your doctor or nurse will decide which tests you should have based on your age, other symptoms, and individual situation.  Here are the most common tests doctors use to find the cause of absent or irregular periods:   Pregnancy test - Pregnancy is a common cause of missed periods. Your doctor will want to find out if you are pregnant before doing any other tests.   Blood tests - These measure hormones that affect the " "reproductive system.   Pelvic ultrasound - This test uses sound waves to make a picture of your uterus, cervix, and vagina (figure 1). The picture can show if there is something wrong with these organs.   MRI - This test uses a large magnet to make detailed pictures of the brain. It can show if there is a problem in the part of the brain that controls the body's hormones. An MRI might be done if blood tests are abnormal.  How are absent and irregular periods treated? -- That depends on what is causing your missed periods, and on whether you want to get pregnant. Possible treatments include:   Birth control pills to make periods regular   Losing weight if you are overweight   Medicines to help you get pregnant if you are having trouble getting pregnant on your own   Changing the way you eat and exercise, such as:   Eating more calories   Gaining weight if you weigh too little   Easing up on exercise, if you exercise a lot   Reducing stress   Hormones to treat hot flashes (if you are going through early menopause)   Medicines to lower prolactin levels (if your pituitary gland is making too much prolactin)  Can absent and irregular periods be prevented? -- You can reduce your chances of missing periods by eating well and staying at a healthy weight. Being too thin or too heavy can cause irregular periods.  All topics are updated as new evidence becomes available and our peer review process is complete.  This topic retrieved from [employer] on: Feb 26, 2024.  Topic 39211 Version 11.0  Release: 32.2.4 - C32.56  © 2024 UpToDate, Inc. and/or its affiliates. All rights reserved.  figure 1: Female reproductive anatomy     The internal organs that make up the female reproductive system are located in the lower belly. These include the uterus, fallopian tubes, ovaries, cervix, and vagina.  The uterus has an inner lining, called the \"endometrium,\" and a thicker outer layer, called the \"myometrium.\"  Graphic 51128 Version " 8.0  Consumer Information Use and Disclaimer   Disclaimer: This generalized information is a limited summary of diagnosis, treatment, and/or medication information. It is not meant to be comprehensive and should be used as a tool to help the user understand and/or assess potential diagnostic and treatment options. It does NOT include all information about conditions, treatments, medications, side effects, or risks that may apply to a specific patient. It is not intended to be medical advice or a substitute for the medical advice, diagnosis, or treatment of a health care provider based on the health care provider's examination and assessment of a patient's specific and unique circumstances. Patients must speak with a health care provider for complete information about their health, medical questions, and treatment options, including any risks or benefits regarding use of medications. This information does not endorse any treatments or medications as safe, effective, or approved for treating a specific patient. UpToDate, Inc. and its affiliates disclaim any warranty or liability relating to this information or the use thereof.The use of this information is governed by the Terms of Use, available at https://www.Phrazit.com/en/know/clinical-effectiveness-terms. 2024© UpToDate, Inc. and its affiliates and/or licensors. All rights reserved.  Copyright   © 2024 UpToDate, Inc. and/or its affiliates. All rights reserved.  Patient Education     Lowering Your Risk of Breast Cancer   About this topic   Breast cancer is a serious illness. Breast cancer is when abnormal cells grow and divide more quickly in your breast. These cells form a growth or tumor. The abnormal cells may enter nearby tissue and spread to other parts of the body. It is the type of cancer most often seen in women. Men can have breast cancer, but it is a rare condition.  General   Some things in your life may increase your risk of breast cancer. You may  not be able to change some of these. Others you can control.  You are more likely to get breast cancer if you:  Have a mother, sister, or daughter who has had breast cancer  Have used hormones for menopause for more than 5 years  Have had radiation therapy to the breast or chest in the past  Are overweight or do not exercise  Had your first menstrual period before you were 10 years old  Went through menopause after age 54  Have never been pregnant or had your first child after age 35  Have had breast cancer before  Drink alcohol in any form  Have dense breasts  Are older in age  There is no certain way to prevent breast cancer. There are things you can do to lower your chances of having breast cancer.  Keep a healthy weight. Lose weight if you are overweight. Being overweight raises your chances of having breast cancer.  Eat a healthy diet to maintain a healthy weight, such as more fruits, vegetables, and lean cuts of meat. Decrease the amount of saturated fat in your diet.  Exercise. Being active helps you keep a healthy weight.  Limit your alcohol intake or do not drink alcohol. The more alcohol you drink, the higher your risk.  Do not smoke cigarettes. Smoking can increase your risk of many types of cancer.  Breastfeed your baby. This may help protect you. The longer you breastfeed, the more protection you have.  Talk with your doctor about:  Limiting or stopping hormone therapy.  Taking certain drugs to prevent breast cancer. For women at high risk of having breast cancer, there are a few drugs that may lower your risk.  Surgery to prevent you from having breast cancer if you are very high risk.  When do I need to call the doctor?   Changes in your breasts  A lump or area in your breast that feels different  Discharge from your nipple  Skin on your breast is dimpled or indented  You have questions or concerns about your breasts  Helpful tips   Talk to your doctor about the best kind of breast cancer screening  for you.  If you want to do self breast exams, have your doctor show you the right way to do them.  Tell your doctor of any abnormal finding.  Last Reviewed Date   2021-10-04  Consumer Information Use and Disclaimer   This generalized information is a limited summary of diagnosis, treatment, and/or medication information. It is not meant to be comprehensive and should be used as a tool to help the user understand and/or assess potential diagnostic and treatment options. It does NOT include all information about conditions, treatments, medications, side effects, or risks that may apply to a specific patient. It is not intended to be medical advice or a substitute for the medical advice, diagnosis, or treatment of a health care provider based on the health care provider's examination and assessment of a patient’s specific and unique circumstances. Patients must speak with a health care provider for complete information about their health, medical questions, and treatment options, including any risks or benefits regarding use of medications. This information does not endorse any treatments or medications as safe, effective, or approved for treating a specific patient. UpToDate, Inc. and its affiliates disclaim any warranty or liability relating to this information or the use thereof. The use of this information is governed by the Terms of Use, available at https://www.[employer]orse.com/en/know/clinical-effectiveness-terms   Copyright   Copyright © 2024 UpToDate, Inc. and its affiliates and/or licensors. All rights reserved.       Perineal Hygiene      Your vaginal naturally takes care of its self, it is a self washing system, the less you mess the healthier it will be     No soaps or feminine wash to the vulva, these products can cause dermitis, bacterial infections and other vulvar problems.   Use only water to cleanse, or water with Dove or Dove Sensitive Skin Bar soap if necessary.    Avoid the use of washcloths,  exfoliating farhana's, netted scrubbers, loofa's, use your hands only to cleanse the vulvar tissues    No scented lotions or products are advised in or near your vulva.    Use coconut oil in its solid form for moisture if needed.  No douching this may cause imbalance in your vaginal PH and further issues.    If you wear panty liners, you may apply a thin coating of Vaseline, A&D ointment or coconut oil to the vulvar tissues as a skin barrier     Cotton underware, loose fitting clothing  Only perfume-free, dye-free laundry detergent, use a second rinse cycle   Avoid fabric softeners/dryer sheets.       Your partner should avoid the same products as well.       Over the counter probiotic to restore vaginal kathi may be helpful as well, take daily.       You may also look into Boric Acid vaginal suppositories to restore vaginal PH balance for up to 2 weeks as directed on the box. You may not use these if you are pregnant      For vaginal dryness:      You may use:     Coconut oil in solid form, not liquid (organic, pure, unscented) as needed for moisture or lubrication. ( Do not use if allergic)       Replens moisture restore external comfort gel daily ( use as directed on the box)        Replens long lasting vaginal moisturizer  ( use as directed on the box)     May try RampedMedia vulvar moisturizer ( found on Amazon )    May try Revaree vaginal inserts        For Vaginal Lubrication:          You may use:     Coconut oil in solid form, not liquid (organic, pure, unscented) as a lubricant or another scent-free lubricant (Astroglide, Uberlube) if needed.  Do not use coconut oil or silicone if using a condom as this may break down the integrity of the condom and cause an unplanned pregnancy              Do not use coconut oil if allergic               Replens silky smooth lubricant, premium silicone based lubricant for intercourse. ( use as directed, a small amount will provide an enhanced natural feeling)     Any premium over  the counter vaginal lubricant water or silicone based. Silicone based will have more staying power.        For Vulvar irritation/itching:        You may use Hydrocortisone 1 % over the counter to external vulvar tissues 2 x daily for 5-7 days to help with irriation and itch relief.  Patient Education     Pelvic Floor Exercises   About this topic   The pelvic floor consists of muscles and strong bands of tissues which support all of the organs in your pelvis. Some of these organs are the bladder and the small and large bowel as well as the womb and the prostate. If the muscles and tissues get weak, your organs may drop. This can lead to other problems. Your urine may leak when you laugh, sneeze, or cough. You may not be able to drain the bladder fully. You may have less problems if you do exercises to strengthen your pelvic floor and abdominal muscles.  Kegel exercises help make the muscles in the pelvic floor stronger. Anyone can do them. It is also important to make your abdominal muscles stronger. In order for these exercises to work, you must be consistent when doing them.  General   Before starting with a program, ask your doctor if you are healthy enough to do these exercises. Your doctor may have you work with a  or physical therapist to make a safe exercise program to meet your needs.  Strengthening Exercises   Kegel exercises keep your pelvic muscles firm and strong. You can do these in many different positions. Start by lying down with your knees bent and feet on the bed. Squeeze the pelvic muscles as if you are trying to stop the flow of urine. Hold these muscles for a count of 3, and then slowly relax them for a count of 3. Try to work up to squeezing for a count of 10 and slowly relaxing for a count of 10. Increase the muscle squeeze until you get to 10. When relaxing, slowly relax to a count of 10.  Breathe out when you are squeezing and breathe in when you are relaxing. Your goal is to try to do  10 Kegels 3 or more times each day. Take time to rest between sets. Be sure to only contract your pelvic floor muscles, not your buttocks, thighs, or abdominal muscles.  Pelvic floor contractions ? There are a few ways to feel the pelvic muscles contract.  When you are passing urine, try suddenly stopping your flow of urine. Do not do this on a regular basis, but only to feel what the contraction feels like. Doing this while passing urine can lead to other problems.  Put a finger into the vagina or rectum. Contract the muscles around your finger as if you were trying to stop the flow of urine or stop the passing of gas.  Place two chairs without arms about 2 inches (5 cm) apart. Sit so you have one butt cheek on each chair. Now, try edd your pelvic floor muscles. This will help you keep from using other muscles.  Pelvic tilts ? Lie on your back with your knees bent and feet flat on the floor. Tighten your stomach muscles and press your lower back down to the floor. Try doing Kegels with this exercise when your back is flattened. Hold 3 to 5 seconds. Relax.  Straight leg raises lying down ? Lie on your back with one leg straight. Bend your other knee so the foot is flat on the bed. Keeping your leg straight, lift the leg up to the level of your other knee. Lower it back down. Repeat with the other leg.  Hip lifts ? Lie on your back with your knees bent and feet flat on the floor. Tighten your stomach muscles and lift your buttocks off the floor. Try doing Kegels when up in this position. Hold 3 to 5 seconds. Relax.  Abdominal bracing ? Do this exercise in different positions: Lying down, sitting, and standing. Tighten your stomach muscles. While keeping the stomach muscles tight, tighten your pelvic floor muscles. Now, forcefully laugh or cough to see if you were able to prevent urine from leaking.  Abdominal crunches ? Lie on your back with both knees bent. Keep your feet flat on the floor. Place your hands  in one of these positions. Try starting with the first position since it is the easiest. As you get better, use the other positions to make it harder.  Crunches with arms at sides.  Crunches with arms across chest.  Crunches with arms behind head. Be careful not to interlock your fingers behind your neck or head while doing crunches. This may add tension to your neck and cause strain.  Look at the ceiling. Tighten your belly muscles and lift your shoulders and upper back off the floor. Breathe out while you are doing this. Lower your shoulders to the floor. Breathe in while you are doing this. Relax your belly muscles all the way before starting another crunch.             What will the results be?   Less leakage of urine when you cough, sneeze, laugh, or run  Fewer strong urges to pass urine  Fewer trips to the bathroom each day  Less risk of organs, such as the uterus or bladder, dropping into the vagina  Faster recovery after childbirth or prostate surgery  Stronger core muscles  Increased sensitivity during sex  Helpful tips   You can also try doing a different kind of Kegels. Do 5 quick, strong pelvic floor contractions. Sometimes, if you have an urge to pass urine but are not near a bathroom, you can do this kind of Kegel to calm the urge.  Stay active and work out to keep your muscles strong and flexible.  Keep a healthy weight to avoid putting too much stress on your spine. Eat a healthy diet to keep your muscles healthy.  Be sure you do not hold your breath when exercising. This can raise your blood pressure. If you tend to hold your breath, try counting out loud when exercising. If any exercise bothers you, stop right away.  Try walking or cycling at an easy pace for a few minutes to warm up your muscles. Do this again after exercising.  Doing exercises before a meal may be a good way to get into a routine. A good time to do these exercises is each time you are stopped at a stop light while  driving.  Exercise may be slightly uncomfortable, but you should not have sharp pains. If you do get sharp pains, stop what you are doing. If the sharp pains continue, call your doctor.  Last Reviewed Date   2021-03-31  Consumer Information Use and Disclaimer   This generalized information is a limited summary of diagnosis, treatment, and/or medication information. It is not meant to be comprehensive and should be used as a tool to help the user understand and/or assess potential diagnostic and treatment options. It does NOT include all information about conditions, treatments, medications, side effects, or risks that may apply to a specific patient. It is not intended to be medical advice or a substitute for the medical advice, diagnosis, or treatment of a health care provider based on the health care provider's examination and assessment of a patient’s specific and unique circumstances. Patients must speak with a health care provider for complete information about their health, medical questions, and treatment options, including any risks or benefits regarding use of medications. This information does not endorse any treatments or medications as safe, effective, or approved for treating a specific patient. UpToDate, Inc. and its affiliates disclaim any warranty or liability relating to this information or the use thereof. The use of this information is governed by the Terms of Use, available at https://www.wolterskluwer.com/en/know/clinical-effectiveness-terms   Copyright   Copyright © 2024 UpToDate, Inc. and its affiliates and/or licensors. All rights reserved.

## 2024-12-18 ENCOUNTER — ANNUAL EXAM (OUTPATIENT)
Dept: OBGYN CLINIC | Facility: CLINIC | Age: 34
End: 2024-12-18
Payer: COMMERCIAL

## 2024-12-18 VITALS
DIASTOLIC BLOOD PRESSURE: 68 MMHG | SYSTOLIC BLOOD PRESSURE: 120 MMHG | BODY MASS INDEX: 33.13 KG/M2 | HEIGHT: 63 IN | WEIGHT: 187 LBS

## 2024-12-18 DIAGNOSIS — Z01.419 ENCOUNTER FOR GYNECOLOGICAL EXAMINATION WITHOUT ABNORMAL FINDING: Primary | ICD-10-CM

## 2024-12-18 DIAGNOSIS — Z32.02 NEGATIVE PREGNANCY TEST: ICD-10-CM

## 2024-12-18 DIAGNOSIS — N91.2 AMENORRHEA: ICD-10-CM

## 2024-12-18 PROCEDURE — 99395 PREV VISIT EST AGE 18-39: CPT | Performed by: OBSTETRICS & GYNECOLOGY

## 2024-12-18 PROCEDURE — 81025 URINE PREGNANCY TEST: CPT | Performed by: OBSTETRICS & GYNECOLOGY

## 2024-12-18 RX ORDER — MEDROXYPROGESTERONE ACETATE 10 MG
10 TABLET ORAL DAILY
Qty: 10 TABLET | Refills: 0 | Status: SHIPPED | OUTPATIENT
Start: 2025-01-12 | End: 2025-01-22

## 2024-12-20 ENCOUNTER — APPOINTMENT (OUTPATIENT)
Age: 34
End: 2024-12-20
Payer: COMMERCIAL

## 2024-12-20 DIAGNOSIS — N91.2 AMENORRHEA: ICD-10-CM

## 2024-12-20 DIAGNOSIS — E06.3 HYPOTHYROIDISM DUE TO HASHIMOTO'S THYROIDITIS: ICD-10-CM

## 2024-12-20 LAB
ESTRADIOL SERPL-MCNC: 37.2 PG/ML
FSH SERPL-ACNC: 15.7 MIU/ML
PROLACTIN SERPL-MCNC: 6.29 NG/ML (ref 3.34–26.72)
T4 FREE SERPL-MCNC: 1.11 NG/DL (ref 0.61–1.12)
TSH SERPL DL<=0.05 MIU/L-ACNC: 1.16 UIU/ML (ref 0.45–4.5)

## 2024-12-20 PROCEDURE — 84403 ASSAY OF TOTAL TESTOSTERONE: CPT

## 2024-12-20 PROCEDURE — 36415 COLL VENOUS BLD VENIPUNCTURE: CPT

## 2024-12-20 PROCEDURE — 84146 ASSAY OF PROLACTIN: CPT

## 2024-12-20 PROCEDURE — 82670 ASSAY OF TOTAL ESTRADIOL: CPT

## 2024-12-20 PROCEDURE — 84439 ASSAY OF FREE THYROXINE: CPT

## 2024-12-20 PROCEDURE — 84443 ASSAY THYROID STIM HORMONE: CPT

## 2024-12-20 PROCEDURE — 83001 ASSAY OF GONADOTROPIN (FSH): CPT

## 2024-12-20 PROCEDURE — 84402 ASSAY OF FREE TESTOSTERONE: CPT

## 2024-12-23 ENCOUNTER — RESULTS FOLLOW-UP (OUTPATIENT)
Dept: ENDOCRINOLOGY | Facility: CLINIC | Age: 34
End: 2024-12-23

## 2024-12-23 LAB
TESTOST FREE SERPL-MCNC: <0.2 PG/ML (ref 0–4.2)
TESTOST SERPL-MCNC: <3 NG/DL (ref 8–60)

## 2024-12-24 ENCOUNTER — RESULTS FOLLOW-UP (OUTPATIENT)
Age: 34
End: 2024-12-24

## 2025-02-04 ENCOUNTER — OFFICE VISIT (OUTPATIENT)
Age: 35
End: 2025-02-04
Payer: COMMERCIAL

## 2025-02-04 VITALS
TEMPERATURE: 97.8 F | OXYGEN SATURATION: 97 % | WEIGHT: 180.8 LBS | HEIGHT: 63 IN | DIASTOLIC BLOOD PRESSURE: 66 MMHG | SYSTOLIC BLOOD PRESSURE: 100 MMHG | BODY MASS INDEX: 32.04 KG/M2 | HEART RATE: 67 BPM

## 2025-02-04 DIAGNOSIS — E06.3 HYPOTHYROIDISM DUE TO HASHIMOTO'S THYROIDITIS: Primary | ICD-10-CM

## 2025-02-04 PROCEDURE — 99214 OFFICE O/P EST MOD 30 MIN: CPT | Performed by: STUDENT IN AN ORGANIZED HEALTH CARE EDUCATION/TRAINING PROGRAM

## 2025-02-04 RX ORDER — ZINC SULFATE 50(220)MG
220 CAPSULE ORAL DAILY
COMMUNITY

## 2025-02-04 RX ORDER — LEVOTHYROXINE SODIUM 88 UG/1
88 TABLET ORAL DAILY
Qty: 90 TABLET | Refills: 1 | Status: SHIPPED | OUTPATIENT
Start: 2025-02-04

## 2025-02-04 RX ORDER — LIOTHYRONINE SODIUM 5 UG/1
5 TABLET ORAL DAILY
Qty: 90 TABLET | Refills: 0 | Status: SHIPPED | OUTPATIENT
Start: 2025-02-04

## 2025-02-04 NOTE — ASSESSMENT & PLAN NOTE
Antonia has history of hypothyroidism, on LT4 replacement therapy, despite her TFT is at goal, she continues to experience symptoms including fatigue, hair loss, amenorrhea, etc.  I discuss that use of combination therapy with LT4 and T3 is recommended in some circumstances of hypothyroidism. in patients who continue to have symptoms attributed to hypothyroidism, despite returning to normal thyroid hormones level. however there is lack of evidence for benefits, especially in presence of heart diseases, cancer or psychiatric disorders.  She is willing to try liothyronine, low-dose 5 mcg daily was started, on LT4 dose was accordingly adjusted to 88 mcg daily and she will reach out to me for any adverse reactions to consider discontinuation of the medication.  Advised that levotyroxine should be taken on an empty stomach. Should avoid taking medications like iron, calcium, tums, [id number], PPI at the same time that may decrease absorption of T4. Should separate administration by 4hrs.    Orders:    liothyronine (CYTOMEL) 5 mcg tablet; Take 1 tablet (5 mcg total) by mouth daily    levothyroxine (Euthyrox) 88 mcg tablet; Take 1 tablet (88 mcg total) by mouth daily    T4, free; Future    TSH, 3rd generation; Future

## 2025-02-04 NOTE — PROGRESS NOTES
Name: Antonia Berman      : 1990      MRN: [id number]  Encounter Provider: Cam Flanagan MD  Encounter Date: 2025   Encounter department: [facility]  :  Assessment & Plan  Hypothyroidism due to Hashimoto's thyroiditis  Antonia has history of hypothyroidism, on LT4 replacement therapy, despite her TFT is at goal, she continues to experience symptoms including fatigue, hair loss, amenorrhea, etc.  I discuss that use of combination therapy with LT4 and T3 is recommended in some circumstances of hypothyroidism. in patients who continue to have symptoms attributed to hypothyroidism, despite returning to normal thyroid hormones level. however there is lack of evidence for benefits, especially in presence of heart diseases, cancer or psychiatric disorders.  She is willing to try liothyronine, low-dose 5 mcg daily was started, on LT4 dose was accordingly adjusted to 88 mcg daily and she will reach out to me for any adverse reactions to consider discontinuation of the medication.  Advised that levotyroxine should be taken on an empty stomach. Should avoid taking medications like iron, calcium, tums, [id number], PPI at the same time that may decrease absorption of T4. Should separate administration by 4hrs.    Orders:    liothyronine (CYTOMEL) 5 mcg tablet; Take 1 tablet (5 mcg total) by mouth daily    levothyroxine (Euthyrox) 88 mcg tablet; Take 1 tablet (88 mcg total) by mouth daily    T4, free; Future    TSH, 3rd generation; Future        History of Present Illness   HPI  Antonia Berman is a 34 y.o. female who presents for follow-up for hypothyroidism.    Antonia is currently on levothyroxine 112 mcg once daily.  Most recent TFT on 2024 showed free T4 of 1.11 and TSH of 1.155.      She reports feeling fatigue and lack of energy.  Her hairs are thinning with brittle nails.  She reports menorrhea for the last 6 months.  Was evaluated by OB/GYN, hormonal  profile including prolactin unremarkable.  No recent weight   Endorses cold intolerance  No bowel movement changes        Review of Systems: Negative except as in HPI  Medical History Reviewed by provider this encounter:     .  Current Outpatient Medications on File Prior to Visit   Medication Sig Dispense Refill    Ascorbic Acid (VITAMIN C PO) Take by mouth      Cholecalciferol (VITAMIN D3 PO) Take by mouth      Ferrous Gluconate (IRON 27 PO) Take by mouth      medroxyPROGESTERone (PROVERA) 10 mg tablet Take 1 tablet (10 mg total) by mouth daily for 10 days If no menstrual cycle by 1/12/25 please take medication as advised Do not start before January 12, 2025. 10 tablet 0    Turmeric (QC TUMERIC COMPLEX PO) Take by mouth      zinc sulfate (ZINCATE) 220 mg capsule Take 220 mg by mouth daily      [DISCONTINUED] levothyroxine 112 mcg tablet Take 1 tablet (112 mcg total) by mouth daily 90 tablet 1    Multiple Vitamins-Minerals (ZINC PO) Take by mouth (Patient not taking: Reported on 2/4/2025)       No current facility-administered medications on file prior to visit.         Objective   There were no vitals taken for this visit.     Physical Exam  Vitals and nursing note reviewed.   Constitutional:       General: She is not in acute distress.     Appearance: She is well-developed.   HENT:      Head: Normocephalic and atraumatic.   Neck:      Thyroid: No thyromegaly or thyroid tenderness.   Cardiovascular:      Rate and Rhythm: Normal rate and regular rhythm.   Pulmonary:      Effort: Pulmonary effort is normal. No respiratory distress.   Abdominal:      Palpations: Abdomen is soft.   Musculoskeletal:         General: No swelling.      Cervical back: Neck supple.   Skin:     General: Skin is warm and dry.   Neurological:      Mental Status: She is alert.         Administrative Statements   I have spent a total time of 35 minutes in caring for this patient on the day of the visit/encounter including Diagnostic results,  Prognosis, Risks and benefits of tx options, Instructions for management, Patient and family education, Importance of tx compliance, Risk factor reductions, Impressions, Counseling / Coordination of care, Documenting in the medical record, Reviewing / ordering tests, medicine, procedures  , and Obtaining or reviewing history  .     Component      Latest Ref Rng 10/21/2024 12/20/2024   TSH 3RD GENERATON      0.450 - 4.500 uIU/mL 1.132  1.155    FREE T4      0.61 - 1.12 ng/dL  1.11    FSH, POC      See Comment mIU/mL  15.7    PROLACTIN      3.34 - 26.72 ng/mL  6.29

## 2025-03-19 ENCOUNTER — RESULTS FOLLOW-UP (OUTPATIENT)
Dept: ENDOCRINOLOGY | Facility: CLINIC | Age: 35
End: 2025-03-19

## 2025-03-19 ENCOUNTER — APPOINTMENT (OUTPATIENT)
Dept: LAB | Facility: HOSPITAL | Age: 35
End: 2025-03-19
Payer: COMMERCIAL

## 2025-03-19 DIAGNOSIS — E06.3 HYPOTHYROIDISM DUE TO HASHIMOTO'S THYROIDITIS: ICD-10-CM

## 2025-03-19 LAB
T4 FREE SERPL-MCNC: 0.89 NG/DL (ref 0.61–1.12)
TSH SERPL DL<=0.05 MIU/L-ACNC: 0.74 UIU/ML (ref 0.45–4.5)

## 2025-03-19 PROCEDURE — 36415 COLL VENOUS BLD VENIPUNCTURE: CPT

## 2025-03-19 PROCEDURE — 84439 ASSAY OF FREE THYROXINE: CPT

## 2025-03-19 PROCEDURE — 84443 ASSAY THYROID STIM HORMONE: CPT

## 2025-03-20 NOTE — TELEPHONE ENCOUNTER
----- Message from Cam Flanagan MD sent at 3/19/2025  1:13 PM EDT -----  TSH is normal, to continue current regimen for now.

## 2025-05-06 DIAGNOSIS — E06.3 HYPOTHYROIDISM DUE TO HASHIMOTO'S THYROIDITIS: Primary | ICD-10-CM

## 2025-05-06 RX ORDER — LEVOTHYROXINE SODIUM 112 UG/1
112 TABLET ORAL DAILY
Qty: 90 TABLET | Refills: 1 | Status: SHIPPED | OUTPATIENT
Start: 2025-05-06

## 2025-05-29 ENCOUNTER — OFFICE VISIT (OUTPATIENT)
Age: 35
End: 2025-05-29
Payer: COMMERCIAL

## 2025-05-29 VITALS
WEIGHT: 183.4 LBS | TEMPERATURE: 97.8 F | HEART RATE: 93 BPM | OXYGEN SATURATION: 97 % | SYSTOLIC BLOOD PRESSURE: 110 MMHG | BODY MASS INDEX: 32.5 KG/M2 | DIASTOLIC BLOOD PRESSURE: 68 MMHG | HEIGHT: 63 IN

## 2025-05-29 DIAGNOSIS — E06.3 HYPOTHYROIDISM DUE TO HASHIMOTO'S THYROIDITIS: Primary | ICD-10-CM

## 2025-05-29 PROCEDURE — 99214 OFFICE O/P EST MOD 30 MIN: CPT

## 2025-05-29 NOTE — PROGRESS NOTES
Name: Antonia Beramn      : 1990      MRN: [id number]  Encounter Provider: DANTE Bowser  Encounter Date: 2025   Encounter department: [facility]  :  Assessment & Plan  Hypothyroidism due to Hashimoto's thyroiditis  Continue levothyroxine 112 mcg daily.  She reports she takes this medication consistently and correctly.    Dose was recently adjusted to account for removal of liothyronine from her regimen; too soon for repeat labs.  Labs due after 2025.  Will await receipt of thyroid function tests prior to making dose adjustments if needed.    Previous thyroid ultrasound completed  with no discrete nodule.  Will send for repeat surveillance ultrasound secondary to thyromegaly.  Orders:  •  US thyroid  •  TSH, 3rd generation; Future  •  T4, free; Future          History of Present Illness     Antonia Berman is a 34 y.o. female who presents to the office today for routine follow-up of hypothyroidism due to Hashimoto's thyroiditis.  She was last seen in the office 2025 by Dr. Flanagan at which time she was started on Cytomel secondary to complaint of overwhelming fatigue.  She does not have updated thyroid function tests for today's visit. She has stopped taking liothyronine secondary to feelings of overwhelming depression.  Symptoms resolved after discontinuation of liothyronine.    No history of external radiation to head/neck/chest.  No recent iodine loading in form of medication, biotin or kelp supplements, or radiological diagnostic studies.      Family history of thyroid disease includes: mother and maternal aunt with Hashimoto's, Father with Grave's  Family history of thyroid cancer includes: No known members    Current Medication Regimen:   Levothyroxine 112 mcg daily  Liothyronine 5 mcg daily - not taking      Eye Exam: Needs to schedule    Component      Latest Ref Rng 3/19/2025   FREE T4      0.61 - 1.12 ng/dL 0.89   "  TSH 3RD GENERATON      0.450 - 4.500 uIU/mL 0.741        History obtained from: patient    Review of Systems   Constitutional:  Negative for appetite change and fatigue.   HENT:  Negative for congestion, trouble swallowing and voice change.    Eyes:  Negative for visual disturbance.   Respiratory:  Positive for cough.    Gastrointestinal:  Negative for constipation and diarrhea.   Genitourinary:  Positive for menstrual problem (Amenorrhea).   Musculoskeletal:  Negative for gait problem.   Neurological:  Negative for tremors.   Psychiatric/Behavioral:  Negative for sleep disturbance.      Medications Ordered Prior to Encounter[1]   Social History[2]     Objective   /68 (BP Location: Left arm, Patient Position: Sitting, Cuff Size: Standard)   Pulse 93   Temp 97.8 °F (36.6 °C) (Temporal)   Ht 5' 3\" (1.6 m)   Wt 83.2 kg (183 lb 6.4 oz)   SpO2 97%   BMI 32.49 kg/m²      Physical Exam  Vitals reviewed.   Constitutional:       General: She is not in acute distress.     Appearance: Normal appearance. She is well-groomed. She is obese.   HENT:      Head: Normocephalic and atraumatic.      Mouth/Throat:      Mouth: Mucous membranes are moist.     Eyes:      Conjunctiva/sclera: Conjunctivae normal.     Neck:      Thyroid: Thyromegaly present. No thyroid mass or thyroid tenderness.     Cardiovascular:      Rate and Rhythm: Normal rate.   Pulmonary:      Effort: Pulmonary effort is normal. No respiratory distress.   Abdominal:      General: There is no distension.      Palpations: Abdomen is soft.     Musculoskeletal:         General: No swelling.      Cervical back: Normal range of motion.     Skin:     General: Skin is warm and dry.      Capillary Refill: Capillary refill takes less than 2 seconds.     Neurological:      General: No focal deficit present.      Mental Status: She is alert and oriented to person, place, and time.     Psychiatric:         Mood and Affect: Mood normal.         Behavior: Behavior " normal. Behavior is cooperative.         Thought Content: Thought content normal.         Judgment: Judgment normal.         Administrative Statements   I have spent a total time of 35 minutes in caring for this patient on the day of the visit/encounter including Diagnostic results, Prognosis, Risks and benefits of tx options, Instructions for management, Patient and family education, Importance of tx compliance, Risk factor reductions, Impressions, Counseling / Coordination of care, Documenting in the medical record, Reviewing/placing orders in the medical record (including tests, medications, and/or procedures), and Obtaining or reviewing history  .         [1]  Current Outpatient Medications on File Prior to Visit   Medication Sig Dispense Refill   • Ascorbic Acid (VITAMIN C PO) Take by mouth     • Cholecalciferol (VITAMIN D3 PO) Take by mouth     • Ferrous Gluconate (IRON 27 PO) Take by mouth     • levothyroxine (Euthyrox) 112 mcg tablet Take 1 tablet (112 mcg total) by mouth daily 90 tablet 1   • Turmeric (QC TUMERIC COMPLEX PO) Take by mouth     • zinc sulfate (ZINCATE) 220 mg capsule Take 220 mg by mouth in the morning.     • medroxyPROGESTERone (PROVERA) 10 mg tablet Take 1 tablet (10 mg total) by mouth daily for 10 days If no menstrual cycle by 1/12/25 please take medication as advised Do not start before January 12, 2025. (Patient not taking: Reported on 5/29/2025) 10 tablet 0   • [DISCONTINUED] Multiple Vitamins-Minerals (ZINC PO) Take by mouth (Patient not taking: Reported on 2/4/2025)       No current facility-administered medications on file prior to visit.   [2]  Social History  Tobacco Use   • Smoking status: Never   • Smokeless tobacco: Never   Vaping Use   • Vaping status: Never Used   Substance and Sexual Activity   • Alcohol use: Yes     Comment: rare   • Drug use: Never   • Sexual activity: Yes     Partners: Male     Birth control/protection: None

## 2025-05-29 NOTE — ASSESSMENT & PLAN NOTE
Continue levothyroxine 112 mcg daily.  She reports she takes this medication consistently and correctly.    Dose was recently adjusted to account for removal of liothyronine from her regimen; too soon for repeat labs.  Labs due after 6/17/2025.  Will await receipt of thyroid function tests prior to making dose adjustments if needed.    Previous thyroid ultrasound completed 2015 with no discrete nodule.  Will send for repeat surveillance ultrasound secondary to thyromegaly.  Orders:  •  US thyroid  •  TSH, 3rd generation; Future  •  T4, free; Future

## 2025-05-29 NOTE — PATIENT INSTRUCTIONS
Continue Levothyroxine 112 mcg daily  Take on an empty stomach, with only water. Wait approximately 60 minutes before consuming anything else besides water.  Medications like multivitamins, iron, calcium, tums, or antacids should be taken approximately 4 hrs apart from Levothyroxine as they may decrease its absorption.    If you miss a dose, you may take 2 tablets the next day.  Do not take more than 2 tablets in one day.  Thyroid labs after 6/17/2025   We will adjust dose as needed  Selenium

## 2025-06-17 ENCOUNTER — APPOINTMENT (OUTPATIENT)
Dept: LAB | Facility: HOSPITAL | Age: 35
End: 2025-06-17
Payer: COMMERCIAL

## 2025-06-17 DIAGNOSIS — E06.3 HYPOTHYROIDISM DUE TO HASHIMOTO'S THYROIDITIS: ICD-10-CM

## 2025-06-17 LAB
T4 FREE SERPL-MCNC: 1.13 NG/DL (ref 0.61–1.12)
TSH SERPL DL<=0.05 MIU/L-ACNC: 0.15 UIU/ML (ref 0.45–4.5)

## 2025-06-17 PROCEDURE — 84439 ASSAY OF FREE THYROXINE: CPT

## 2025-06-17 PROCEDURE — 36415 COLL VENOUS BLD VENIPUNCTURE: CPT

## 2025-06-17 PROCEDURE — 84443 ASSAY THYROID STIM HORMONE: CPT

## 2025-06-19 ENCOUNTER — RESULTS FOLLOW-UP (OUTPATIENT)
Age: 35
End: 2025-06-19

## 2025-06-19 DIAGNOSIS — E06.3 HYPOTHYROIDISM DUE TO HASHIMOTO'S THYROIDITIS: ICD-10-CM

## 2025-06-19 RX ORDER — LEVOTHYROXINE SODIUM 112 UG/1
TABLET ORAL
Qty: 90 TABLET | Refills: 1 | Status: SHIPPED | OUTPATIENT
Start: 2025-06-19